# Patient Record
Sex: FEMALE | Race: WHITE | NOT HISPANIC OR LATINO
[De-identification: names, ages, dates, MRNs, and addresses within clinical notes are randomized per-mention and may not be internally consistent; named-entity substitution may affect disease eponyms.]

---

## 2017-01-24 ENCOUNTER — OTHER (OUTPATIENT)
Age: 77
End: 2017-01-24

## 2017-01-25 ENCOUNTER — OTHER (OUTPATIENT)
Age: 77
End: 2017-01-25

## 2017-01-25 ENCOUNTER — APPOINTMENT (OUTPATIENT)
Dept: RADIATION ONCOLOGY | Facility: CLINIC | Age: 77
End: 2017-01-25

## 2017-02-17 ENCOUNTER — OUTPATIENT (OUTPATIENT)
Dept: OUTPATIENT SERVICES | Facility: HOSPITAL | Age: 77
LOS: 1 days | Discharge: HOME | End: 2017-02-17

## 2017-02-17 DIAGNOSIS — Z98.89 OTHER SPECIFIED POSTPROCEDURAL STATES: Chronic | ICD-10-CM

## 2017-02-17 DIAGNOSIS — Z90.49 ACQUIRED ABSENCE OF OTHER SPECIFIED PARTS OF DIGESTIVE TRACT: Chronic | ICD-10-CM

## 2017-02-17 DIAGNOSIS — C22.1 INTRAHEPATIC BILE DUCT CARCINOMA: ICD-10-CM

## 2017-02-20 ENCOUNTER — RESULT REVIEW (OUTPATIENT)
Age: 77
End: 2017-02-20

## 2017-02-21 ENCOUNTER — RX RENEWAL (OUTPATIENT)
Age: 77
End: 2017-02-21

## 2017-02-21 ENCOUNTER — APPOINTMENT (OUTPATIENT)
Dept: HEMATOLOGY ONCOLOGY | Facility: CLINIC | Age: 77
End: 2017-02-21

## 2017-02-21 VITALS
RESPIRATION RATE: 16 BRPM | WEIGHT: 153.44 LBS | SYSTOLIC BLOOD PRESSURE: 159 MMHG | OXYGEN SATURATION: 98 % | TEMPERATURE: 98.1 F | DIASTOLIC BLOOD PRESSURE: 83 MMHG | BODY MASS INDEX: 29.97 KG/M2 | HEART RATE: 70 BPM

## 2017-02-21 DIAGNOSIS — K21.9 GASTRO-ESOPHAGEAL REFLUX DISEASE W/OUT ESOPHAGITIS: ICD-10-CM

## 2017-02-21 LAB
BASOPHILS # BLD AUTO: 0.1 K/UL — SIGNIFICANT CHANGE UP (ref 0–0.2)
BASOPHILS NFR BLD AUTO: 0.6 % — SIGNIFICANT CHANGE UP (ref 0–2)
EOSINOPHIL # BLD AUTO: 0.2 K/UL — SIGNIFICANT CHANGE UP (ref 0–0.5)
EOSINOPHIL NFR BLD AUTO: 2.3 % — SIGNIFICANT CHANGE UP (ref 0–6)
HCT VFR BLD CALC: 36.9 % — SIGNIFICANT CHANGE UP (ref 34.5–45)
HGB BLD-MCNC: 12.3 G/DL — SIGNIFICANT CHANGE UP (ref 11.5–15.5)
LYMPHOCYTES # BLD AUTO: 1.3 K/UL — SIGNIFICANT CHANGE UP (ref 1–3.3)
LYMPHOCYTES # BLD AUTO: 12 % — LOW (ref 13–44)
MCHC RBC-ENTMCNC: 30.6 PG — SIGNIFICANT CHANGE UP (ref 27–34)
MCHC RBC-ENTMCNC: 33.4 G/DL — SIGNIFICANT CHANGE UP (ref 32–36)
MCV RBC AUTO: 91.7 FL — SIGNIFICANT CHANGE UP (ref 80–100)
MONOCYTES # BLD AUTO: 0.8 K/UL — SIGNIFICANT CHANGE UP (ref 0–0.9)
MONOCYTES NFR BLD AUTO: 7.8 % — SIGNIFICANT CHANGE UP (ref 2–14)
NEUTROPHILS # BLD AUTO: 8.2 K/UL — HIGH (ref 1.8–7.4)
NEUTROPHILS NFR BLD AUTO: 77.2 % — HIGH (ref 43–77)
PLATELET # BLD AUTO: 232 K/UL — SIGNIFICANT CHANGE UP (ref 150–400)
RBC # BLD: 4.03 M/UL — SIGNIFICANT CHANGE UP (ref 3.8–5.2)
RBC # FLD: 12.4 % — SIGNIFICANT CHANGE UP (ref 10.3–14.5)
WBC # BLD: 10.5 K/UL — SIGNIFICANT CHANGE UP (ref 3.8–10.5)
WBC # FLD AUTO: 10.5 K/UL — SIGNIFICANT CHANGE UP (ref 3.8–10.5)

## 2017-02-27 LAB
ALBUMIN SERPL ELPH-MCNC: 4.1 G/DL
ALP BLD-CCNC: 101 U/L
ALT SERPL-CCNC: 26 U/L
ANION GAP SERPL CALC-SCNC: 14 MMOL/L
AST SERPL-CCNC: 23 U/L
BILIRUB SERPL-MCNC: 0.3 MG/DL
BUN SERPL-MCNC: 23 MG/DL
CALCIUM SERPL-MCNC: 9.8 MG/DL
CANCER AG19-9 SERPL-ACNC: 32.5 U/ML
CEA SERPL-MCNC: 3.2 NG/ML
CHLORIDE SERPL-SCNC: 102 MMOL/L
CO2 SERPL-SCNC: 24 MMOL/L
CREAT SERPL-MCNC: 0.89 MG/DL
FERRITIN SERPL-MCNC: 71.9 NG/ML
GLUCOSE SERPL-MCNC: 115 MG/DL
POTASSIUM SERPL-SCNC: 5 MMOL/L
PROT SERPL-MCNC: 6.6 G/DL
SODIUM SERPL-SCNC: 141 MMOL/L

## 2017-03-05 ENCOUNTER — FORM ENCOUNTER (OUTPATIENT)
Age: 77
End: 2017-03-05

## 2017-03-06 ENCOUNTER — OUTPATIENT (OUTPATIENT)
Dept: OUTPATIENT SERVICES | Facility: HOSPITAL | Age: 77
LOS: 1 days | End: 2017-03-06
Payer: MEDICARE

## 2017-03-06 ENCOUNTER — APPOINTMENT (OUTPATIENT)
Dept: CT IMAGING | Facility: CLINIC | Age: 77
End: 2017-03-06

## 2017-03-06 DIAGNOSIS — Z90.49 ACQUIRED ABSENCE OF OTHER SPECIFIED PARTS OF DIGESTIVE TRACT: Chronic | ICD-10-CM

## 2017-03-06 DIAGNOSIS — Z00.8 ENCOUNTER FOR OTHER GENERAL EXAMINATION: ICD-10-CM

## 2017-03-06 DIAGNOSIS — Z98.89 OTHER SPECIFIED POSTPROCEDURAL STATES: Chronic | ICD-10-CM

## 2017-03-06 PROCEDURE — 74177 CT ABD & PELVIS W/CONTRAST: CPT

## 2017-03-06 PROCEDURE — 71260 CT THORAX DX C+: CPT

## 2017-03-09 ENCOUNTER — APPOINTMENT (OUTPATIENT)
Dept: FAMILY MEDICINE | Facility: CLINIC | Age: 77
End: 2017-03-09

## 2017-03-09 VITALS
SYSTOLIC BLOOD PRESSURE: 124 MMHG | DIASTOLIC BLOOD PRESSURE: 70 MMHG | HEART RATE: 72 BPM | WEIGHT: 153.38 LBS | HEIGHT: 60 IN | BODY MASS INDEX: 30.11 KG/M2

## 2017-03-09 VITALS — DIASTOLIC BLOOD PRESSURE: 90 MMHG | SYSTOLIC BLOOD PRESSURE: 140 MMHG | HEART RATE: 72 BPM | RESPIRATION RATE: 16 BRPM

## 2017-03-09 RX ORDER — UREA 10 %
140 (45 FE) LOTION (ML) TOPICAL
Refills: 0 | Status: DISCONTINUED | COMMUNITY
Start: 2017-02-21 | End: 2017-03-09

## 2017-03-23 ENCOUNTER — APPOINTMENT (OUTPATIENT)
Dept: RADIATION ONCOLOGY | Facility: CLINIC | Age: 77
End: 2017-03-23

## 2017-03-23 VITALS
HEART RATE: 74 BPM | BODY MASS INDEX: 30.05 KG/M2 | WEIGHT: 153.88 LBS | OXYGEN SATURATION: 98 % | SYSTOLIC BLOOD PRESSURE: 164 MMHG | DIASTOLIC BLOOD PRESSURE: 90 MMHG | RESPIRATION RATE: 18 BRPM

## 2017-04-09 ENCOUNTER — RX RENEWAL (OUTPATIENT)
Age: 77
End: 2017-04-09

## 2017-05-10 ENCOUNTER — APPOINTMENT (OUTPATIENT)
Dept: FAMILY MEDICINE | Facility: CLINIC | Age: 77
End: 2017-05-10

## 2017-05-10 ENCOUNTER — RX RENEWAL (OUTPATIENT)
Age: 77
End: 2017-05-10

## 2017-05-10 VITALS
BODY MASS INDEX: 29.3 KG/M2 | DIASTOLIC BLOOD PRESSURE: 86 MMHG | HEIGHT: 60 IN | SYSTOLIC BLOOD PRESSURE: 138 MMHG | WEIGHT: 149.25 LBS

## 2017-05-10 VITALS — SYSTOLIC BLOOD PRESSURE: 140 MMHG | HEART RATE: 80 BPM | DIASTOLIC BLOOD PRESSURE: 90 MMHG | RESPIRATION RATE: 16 BRPM

## 2017-05-11 LAB
ALBUMIN SERPL ELPH-MCNC: 4.1 G/DL
ALP BLD-CCNC: 94 U/L
ALT SERPL-CCNC: 27 U/L
ANION GAP SERPL CALC-SCNC: 14 MMOL/L
AST SERPL-CCNC: 31 U/L
BASOPHILS # BLD AUTO: 0.02 K/UL
BASOPHILS NFR BLD AUTO: 0.2 %
BILIRUB SERPL-MCNC: 0.3 MG/DL
BUN SERPL-MCNC: 18 MG/DL
CALCIUM SERPL-MCNC: 10 MG/DL
CHLORIDE SERPL-SCNC: 102 MMOL/L
CHOLEST SERPL-MCNC: 170 MG/DL
CHOLEST/HDLC SERPL: 4 RATIO
CO2 SERPL-SCNC: 23 MMOL/L
CREAT SERPL-MCNC: 0.78 MG/DL
EOSINOPHIL # BLD AUTO: 0.22 K/UL
EOSINOPHIL NFR BLD AUTO: 2.6 %
GLUCOSE SERPL-MCNC: 119 MG/DL
HBA1C MFR BLD HPLC: 5.9 %
HCT VFR BLD CALC: 36.9 %
HDLC SERPL-MCNC: 43 MG/DL
HGB BLD-MCNC: 11.7 G/DL
IMM GRANULOCYTES NFR BLD AUTO: 0.2 %
LDLC SERPL CALC-MCNC: 97 MG/DL
LYMPHOCYTES # BLD AUTO: 1.2 K/UL
LYMPHOCYTES NFR BLD AUTO: 14.2 %
MAN DIFF?: NORMAL
MCHC RBC-ENTMCNC: 29 PG
MCHC RBC-ENTMCNC: 31.7 GM/DL
MCV RBC AUTO: 91.6 FL
MONOCYTES # BLD AUTO: 0.48 K/UL
MONOCYTES NFR BLD AUTO: 5.7 %
NEUTROPHILS # BLD AUTO: 6.52 K/UL
NEUTROPHILS NFR BLD AUTO: 77.1 %
PLATELET # BLD AUTO: 262 K/UL
POTASSIUM SERPL-SCNC: 4 MMOL/L
PROT SERPL-MCNC: 7.1 G/DL
RBC # BLD: 4.03 M/UL
RBC # FLD: 14.6 %
SODIUM SERPL-SCNC: 139 MMOL/L
T4 SERPL-MCNC: 6.2 UG/DL
TRIGL SERPL-MCNC: 150 MG/DL
TSH SERPL-ACNC: 3.22 UIU/ML
URATE SERPL-MCNC: 3.3 MG/DL
WBC # FLD AUTO: 8.46 K/UL

## 2017-05-12 ENCOUNTER — TRANSCRIPTION ENCOUNTER (OUTPATIENT)
Age: 77
End: 2017-05-12

## 2017-06-01 ENCOUNTER — OUTPATIENT (OUTPATIENT)
Dept: OUTPATIENT SERVICES | Facility: HOSPITAL | Age: 77
LOS: 1 days | Discharge: HOME | End: 2017-06-01

## 2017-06-01 DIAGNOSIS — C22.1 INTRAHEPATIC BILE DUCT CARCINOMA: ICD-10-CM

## 2017-06-01 DIAGNOSIS — Z98.89 OTHER SPECIFIED POSTPROCEDURAL STATES: Chronic | ICD-10-CM

## 2017-06-01 DIAGNOSIS — Z90.49 ACQUIRED ABSENCE OF OTHER SPECIFIED PARTS OF DIGESTIVE TRACT: Chronic | ICD-10-CM

## 2017-06-06 ENCOUNTER — RESULT REVIEW (OUTPATIENT)
Age: 77
End: 2017-06-06

## 2017-06-06 ENCOUNTER — INPATIENT (INPATIENT)
Facility: HOSPITAL | Age: 77
LOS: 6 days | Discharge: HOME | DRG: 380 | End: 2017-06-13
Attending: INTERNAL MEDICINE | Admitting: STUDENT IN AN ORGANIZED HEALTH CARE EDUCATION/TRAINING PROGRAM
Payer: MEDICARE

## 2017-06-06 ENCOUNTER — APPOINTMENT (OUTPATIENT)
Dept: HEMATOLOGY ONCOLOGY | Facility: CLINIC | Age: 77
End: 2017-06-06

## 2017-06-06 VITALS
RESPIRATION RATE: 18 BRPM | TEMPERATURE: 98 F | OXYGEN SATURATION: 100 % | HEART RATE: 88 BPM | DIASTOLIC BLOOD PRESSURE: 91 MMHG | SYSTOLIC BLOOD PRESSURE: 153 MMHG

## 2017-06-06 VITALS
TEMPERATURE: 97.8 F | WEIGHT: 142.86 LBS | DIASTOLIC BLOOD PRESSURE: 72 MMHG | HEART RATE: 82 BPM | RESPIRATION RATE: 16 BRPM | OXYGEN SATURATION: 100 % | BODY MASS INDEX: 27.9 KG/M2 | SYSTOLIC BLOOD PRESSURE: 108 MMHG

## 2017-06-06 DIAGNOSIS — Z98.89 OTHER SPECIFIED POSTPROCEDURAL STATES: Chronic | ICD-10-CM

## 2017-06-06 DIAGNOSIS — D62 ACUTE POSTHEMORRHAGIC ANEMIA: ICD-10-CM

## 2017-06-06 DIAGNOSIS — I95.1 ORTHOSTATIC HYPOTENSION: ICD-10-CM

## 2017-06-06 DIAGNOSIS — C22.1 INTRAHEPATIC BILE DUCT CARCINOMA: ICD-10-CM

## 2017-06-06 DIAGNOSIS — Z29.9 ENCOUNTER FOR PROPHYLACTIC MEASURES, UNSPECIFIED: ICD-10-CM

## 2017-06-06 DIAGNOSIS — E11.9 TYPE 2 DIABETES MELLITUS WITHOUT COMPLICATIONS: ICD-10-CM

## 2017-06-06 DIAGNOSIS — D64.9 ANEMIA, UNSPECIFIED: ICD-10-CM

## 2017-06-06 DIAGNOSIS — I10 ESSENTIAL (PRIMARY) HYPERTENSION: ICD-10-CM

## 2017-06-06 DIAGNOSIS — Z90.49 ACQUIRED ABSENCE OF OTHER SPECIFIED PARTS OF DIGESTIVE TRACT: Chronic | ICD-10-CM

## 2017-06-06 LAB
ALBUMIN SERPL ELPH-MCNC: 4.1 G/DL — SIGNIFICANT CHANGE UP (ref 3.3–5)
ALP SERPL-CCNC: 90 U/L — SIGNIFICANT CHANGE UP (ref 40–120)
ALT FLD-CCNC: 26 U/L RC — SIGNIFICANT CHANGE UP (ref 10–45)
ANION GAP SERPL CALC-SCNC: 15 MMOL/L — SIGNIFICANT CHANGE UP (ref 5–17)
ANISOCYTOSIS BLD QL: SLIGHT — SIGNIFICANT CHANGE UP
APTT BLD: 26.7 SEC — LOW (ref 27.5–37.4)
AST SERPL-CCNC: 23 U/L — SIGNIFICANT CHANGE UP (ref 10–40)
BASE EXCESS BLDV CALC-SCNC: -0.1 MMOL/L — SIGNIFICANT CHANGE UP (ref -2–2)
BASOPHILS # BLD AUTO: 0 K/UL — SIGNIFICANT CHANGE UP (ref 0–0.2)
BASOPHILS # BLD AUTO: 0 K/UL — SIGNIFICANT CHANGE UP (ref 0–0.2)
BASOPHILS NFR BLD AUTO: 0.4 % — SIGNIFICANT CHANGE UP (ref 0–2)
BILIRUB SERPL-MCNC: 0.4 MG/DL — SIGNIFICANT CHANGE UP (ref 0.2–1.2)
BLD GP AB SCN SERPL QL: NEGATIVE — SIGNIFICANT CHANGE UP
BUN SERPL-MCNC: 15 MG/DL — SIGNIFICANT CHANGE UP (ref 7–23)
CA-I SERPL-SCNC: 1.24 MMOL/L — SIGNIFICANT CHANGE UP (ref 1.12–1.3)
CALCIUM SERPL-MCNC: 9.6 MG/DL — SIGNIFICANT CHANGE UP (ref 8.4–10.5)
CHLORIDE BLDV-SCNC: 107 MMOL/L — SIGNIFICANT CHANGE UP (ref 96–108)
CHLORIDE SERPL-SCNC: 104 MMOL/L — SIGNIFICANT CHANGE UP (ref 96–108)
CO2 BLDV-SCNC: 25 MMOL/L — SIGNIFICANT CHANGE UP (ref 22–30)
CO2 SERPL-SCNC: 22 MMOL/L — SIGNIFICANT CHANGE UP (ref 22–31)
CREAT SERPL-MCNC: 0.77 MG/DL — SIGNIFICANT CHANGE UP (ref 0.5–1.3)
EOSINOPHIL # BLD AUTO: 0.2 K/UL — SIGNIFICANT CHANGE UP (ref 0–0.5)
EOSINOPHIL # BLD AUTO: 0.2 K/UL — SIGNIFICANT CHANGE UP (ref 0–0.5)
EOSINOPHIL NFR BLD AUTO: 1 % — SIGNIFICANT CHANGE UP (ref 0–6)
EOSINOPHIL NFR BLD AUTO: 2.2 % — SIGNIFICANT CHANGE UP (ref 0–6)
GAS PNL BLDV: 136 MMOL/L — SIGNIFICANT CHANGE UP (ref 136–145)
GAS PNL BLDV: SIGNIFICANT CHANGE UP
GAS PNL BLDV: SIGNIFICANT CHANGE UP
GLUCOSE BLDV-MCNC: 117 MG/DL — HIGH (ref 70–99)
GLUCOSE SERPL-MCNC: 124 MG/DL — HIGH (ref 70–99)
HCO3 BLDV-SCNC: 24 MMOL/L — SIGNIFICANT CHANGE UP (ref 21–29)
HCT VFR BLD CALC: 17.7 % — CRITICAL LOW (ref 34.5–45)
HCT VFR BLD CALC: 19 % — CRITICAL LOW (ref 34.5–45)
HCT VFR BLDA CALC: 19 % — CRITICAL LOW (ref 39–50)
HGB BLD CALC-MCNC: 6.1 G/DL — CRITICAL LOW (ref 11.5–15.5)
HGB BLD-MCNC: 6 G/DL — CRITICAL LOW (ref 11.5–15.5)
HGB BLD-MCNC: 6.1 G/DL — CRITICAL LOW (ref 11.5–15.5)
HYPOCHROMIA BLD QL: SLIGHT — SIGNIFICANT CHANGE UP
INR BLD: 0.95 RATIO — SIGNIFICANT CHANGE UP (ref 0.88–1.16)
LACTATE BLDV-MCNC: 1.4 MMOL/L — SIGNIFICANT CHANGE UP (ref 0.7–2)
LDH SERPL L TO P-CCNC: 154 U/L — SIGNIFICANT CHANGE UP (ref 50–242)
LYMPHOCYTES # BLD AUTO: 1.6 K/UL — SIGNIFICANT CHANGE UP (ref 1–3.3)
LYMPHOCYTES # BLD AUTO: 1.8 K/UL — SIGNIFICANT CHANGE UP (ref 1–3.3)
LYMPHOCYTES # BLD AUTO: 18.6 % — SIGNIFICANT CHANGE UP (ref 13–44)
LYMPHOCYTES # BLD AUTO: 25 % — SIGNIFICANT CHANGE UP (ref 13–44)
MACROCYTES BLD QL: SLIGHT — SIGNIFICANT CHANGE UP
MCHC RBC-ENTMCNC: 29.8 PG — SIGNIFICANT CHANGE UP (ref 27–34)
MCHC RBC-ENTMCNC: 30.3 PG — SIGNIFICANT CHANGE UP (ref 27–34)
MCHC RBC-ENTMCNC: 32.3 GM/DL — SIGNIFICANT CHANGE UP (ref 32–36)
MCHC RBC-ENTMCNC: 33.7 G/DL — SIGNIFICANT CHANGE UP (ref 32–36)
MCV RBC AUTO: 90 FL — SIGNIFICANT CHANGE UP (ref 80–100)
MCV RBC AUTO: 92.3 FL — SIGNIFICANT CHANGE UP (ref 80–100)
MICROCYTES BLD QL: SLIGHT — SIGNIFICANT CHANGE UP
MONOCYTES # BLD AUTO: 0.7 K/UL — SIGNIFICANT CHANGE UP (ref 0–0.9)
MONOCYTES # BLD AUTO: 0.8 K/UL — SIGNIFICANT CHANGE UP (ref 0–0.9)
MONOCYTES NFR BLD AUTO: 2 % — SIGNIFICANT CHANGE UP (ref 2–14)
MONOCYTES NFR BLD AUTO: 8.8 % — SIGNIFICANT CHANGE UP (ref 2–14)
NEUTROPHILS # BLD AUTO: 5.9 K/UL — SIGNIFICANT CHANGE UP (ref 1.8–7.4)
NEUTROPHILS # BLD AUTO: 6 K/UL — SIGNIFICANT CHANGE UP (ref 1.8–7.4)
NEUTROPHILS NFR BLD AUTO: 69.9 % — SIGNIFICANT CHANGE UP (ref 43–77)
NEUTROPHILS NFR BLD AUTO: 72 % — SIGNIFICANT CHANGE UP (ref 43–77)
NRBC # BLD: 2 /100 — HIGH (ref 0–0)
PCO2 BLDV: 40 MMHG — SIGNIFICANT CHANGE UP (ref 35–50)
PH BLDV: 7.4 — SIGNIFICANT CHANGE UP (ref 7.35–7.45)
PLAT MORPH BLD: NORMAL — SIGNIFICANT CHANGE UP
PLATELET # BLD AUTO: 402 K/UL — HIGH (ref 150–400)
PLATELET # BLD AUTO: 417 K/UL — HIGH (ref 150–400)
PO2 BLDV: 19 MMHG — LOW (ref 25–45)
POIKILOCYTOSIS BLD QL AUTO: SLIGHT — SIGNIFICANT CHANGE UP
POLYCHROMASIA BLD QL SMEAR: SLIGHT — SIGNIFICANT CHANGE UP
POTASSIUM BLDV-SCNC: 3.9 MMOL/L — SIGNIFICANT CHANGE UP (ref 3.5–5)
POTASSIUM SERPL-MCNC: 4.4 MMOL/L — SIGNIFICANT CHANGE UP (ref 3.5–5.3)
POTASSIUM SERPL-SCNC: 4.4 MMOL/L — SIGNIFICANT CHANGE UP (ref 3.5–5.3)
PROT SERPL-MCNC: 6.8 G/DL — SIGNIFICANT CHANGE UP (ref 6–8.3)
PROTHROM AB SERPL-ACNC: 10.3 SEC — SIGNIFICANT CHANGE UP (ref 9.8–12.7)
RBC # BLD: 1.96 M/UL — LOW (ref 3.8–5.2)
RBC # BLD: 2.06 M/UL — LOW (ref 3.8–5.2)
RBC # FLD: 15.3 % — HIGH (ref 10.3–14.5)
RBC # FLD: 15.5 % — HIGH (ref 10.3–14.5)
RBC BLD AUTO: ABNORMAL
RH IG SCN BLD-IMP: POSITIVE — SIGNIFICANT CHANGE UP
SAO2 % BLDV: 21 % — LOW (ref 67–88)
SODIUM SERPL-SCNC: 141 MMOL/L — SIGNIFICANT CHANGE UP (ref 135–145)
WBC # BLD: 8.6 K/UL — SIGNIFICANT CHANGE UP (ref 3.8–10.5)
WBC # BLD: 8.6 K/UL — SIGNIFICANT CHANGE UP (ref 3.8–10.5)
WBC # FLD AUTO: 8.6 K/UL — SIGNIFICANT CHANGE UP (ref 3.8–10.5)
WBC # FLD AUTO: 8.6 K/UL — SIGNIFICANT CHANGE UP (ref 3.8–10.5)

## 2017-06-06 PROCEDURE — 71010: CPT | Mod: 26

## 2017-06-06 PROCEDURE — 99223 1ST HOSP IP/OBS HIGH 75: CPT | Mod: AI,GC

## 2017-06-06 PROCEDURE — 99285 EMERGENCY DEPT VISIT HI MDM: CPT | Mod: 25,GC

## 2017-06-06 PROCEDURE — 93010 ELECTROCARDIOGRAM REPORT: CPT | Mod: GC

## 2017-06-06 RX ORDER — VALSARTAN 80 MG/1
160 TABLET ORAL DAILY
Qty: 0 | Refills: 0 | Status: DISCONTINUED | OUTPATIENT
Start: 2017-06-06 | End: 2017-06-06

## 2017-06-06 RX ORDER — METOPROLOL TARTRATE 50 MG
50 TABLET ORAL DAILY
Qty: 0 | Refills: 0 | Status: DISCONTINUED | OUTPATIENT
Start: 2017-06-06 | End: 2017-06-06

## 2017-06-06 RX ORDER — ESOMEPRAZOLE MAGNESIUM 40 MG/1
20 CAPSULE, DELAYED RELEASE ORAL
Qty: 0 | Refills: 0 | COMMUNITY

## 2017-06-06 RX ORDER — PANTOPRAZOLE SODIUM 20 MG/1
80 TABLET, DELAYED RELEASE ORAL ONCE
Qty: 0 | Refills: 0 | Status: COMPLETED | OUTPATIENT
Start: 2017-06-06 | End: 2017-06-06

## 2017-06-06 RX ORDER — SODIUM CHLORIDE 9 MG/ML
1000 INJECTION INTRAMUSCULAR; INTRAVENOUS; SUBCUTANEOUS
Qty: 0 | Refills: 0 | Status: COMPLETED | OUTPATIENT
Start: 2017-06-06 | End: 2017-06-07

## 2017-06-06 RX ORDER — PANTOPRAZOLE SODIUM 20 MG/1
40 TABLET, DELAYED RELEASE ORAL
Qty: 0 | Refills: 0 | Status: DISCONTINUED | OUTPATIENT
Start: 2017-06-06 | End: 2017-06-13

## 2017-06-06 RX ORDER — ALPRAZOLAM 0.25 MG
1 TABLET ORAL
Qty: 0 | Refills: 0 | COMMUNITY

## 2017-06-06 RX ORDER — ATORVASTATIN CALCIUM 80 MG/1
40 TABLET, FILM COATED ORAL AT BEDTIME
Qty: 0 | Refills: 0 | Status: DISCONTINUED | OUTPATIENT
Start: 2017-06-06 | End: 2017-06-13

## 2017-06-06 RX ORDER — ALPRAZOLAM 0.25 MG
0.25 TABLET ORAL DAILY
Qty: 0 | Refills: 0 | Status: DISCONTINUED | OUTPATIENT
Start: 2017-06-06 | End: 2017-06-07

## 2017-06-06 RX ADMIN — Medication 0.25 MILLIGRAM(S): at 22:18

## 2017-06-06 RX ADMIN — PANTOPRAZOLE SODIUM 80 MILLIGRAM(S): 20 TABLET, DELAYED RELEASE ORAL at 20:36

## 2017-06-06 RX ADMIN — SODIUM CHLORIDE 75 MILLILITER(S): 9 INJECTION INTRAMUSCULAR; INTRAVENOUS; SUBCUTANEOUS at 22:19

## 2017-06-06 NOTE — H&P ADULT. - OTHER CARE PROVIDERS
Oncologist: Dr. Adama Stahl ([facility]); Rad Onc: Dr. Edward, Dr. Valdez; GI: Dr. Mancuso

## 2017-06-06 NOTE — H&P ADULT. - ATTENDING COMMENTS
76F with h/o cholangiocarcinoma s/p partial resection s/p chemo/RT, HTN, HLD, DMII, and anxiety  p/w symptomatic anemia , Hgb of 6.0 ( baseline 12) reporting  melanotic stool , c/o lightheadedness , on ASA at home ,  BP stable, s/p 2U PRBC in ED; will monitor hgb closely, will keep NPO and call GI for EGD

## 2017-06-06 NOTE — H&P ADULT. - PROBLEM SELECTOR PLAN 1
Hgb of 6.1  on admission (from baseline Hgb of ~12). Guaic positive in ED, and history of melanotic stool concerning for GIB. Most likely 2/2 upper GI source (differential dx: peptic ulcer disease vs erosive gastritis (2/2 radiation effect?) vs AVM/ ectasia vs 2/2 malignancy). S/p 2U PRBC in ED.   - keep NPO for now; GI consult in a.m.; pt likely benefit from EGD +/- colonoscopy   - c/w protonix 40 IV BID likely in setting of UGIB   - f/u post-transfusion CBC   - c/t trend Hgb [id number]; transfuse for Hgb<7.0   - hold home ASA and avoid A/C  - NS IVF @ 75cc/hr while NPO

## 2017-06-06 NOTE — H&P ADULT. - HISTORY OF PRESENT ILLNESS
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  Pt had routine follow-up appt with her oncologist earlier today, during which time labs incidentally revealed a Hgb of 6.1/ Hct of 17.7 (MCV 90), prompitng referral to ER for further evaluation and blood transfusion. Pt denies any overt signs of GIB; no hematochezia, no BPPBR, no hematemesis, no melanotic sools.    In the ED, VS: T: 98.3, HR: 80-90s, BP: 153-123/91-66, RR: 18-17, SpO2: % on RA. Labs on admission notable for Hgb of 6.1 (from 6.0 this a.m.), Hct of 19, MCV 92.3, plt 417, serum glucose 124. In the ED, pt recieved 1U PRBC, 2U currently being transfused and protonix 80mg IV x 1. CXR reveals clear lungs b/l. 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  Pt had routine follow-up appt with her oncologist earlier today, during which time labs incidentally revealed a Hgb of 6.1/ Hct of 17.7 (MCV 90), prompitng referral to ER for further evaluation and blood transfusion. Pt denies any overt signs of GIB; no hematochezia, no BPPBR, no hematemesis, no melanotic stools.  Pt has never had colonoscopy performed. Had surveillance EUS/ERCP last performed 11/2016, that revealed normal esophagus/stomach/ duondenal bulb with aquired severe duodenal stenosis.     In the ED, VS: T: 98.3, HR: 80-90s, BP: 153-123/91-66, RR: 18-17, SpO2: % on RA. Labs on admission notable for Hgb of 6.1 (from 6.0 this a.m.), Hct of 19, MCV 92.3, plt 417, serum glucose 124. In the ED, pt recieved 1U PRBC, 2U currently being transfused and protonix 80mg IV x 1. CXR reveals clear lungs b/l. 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  Pt had routine follow-up appt with her oncologist earlier today, during which time labs incidentally revealed a Hgb of 6.1/ Hct of 17.7 (MCV 90), prompitng referral to ER for further evaluation and blood transfusion.  Pt reports extreme fatigue since Friday 6/2 and "heaviness" of her legs. She also endorses noticing dark, tarry stool over the past few days, which she has never experienced in the past. No hematochezia, no BPPBR, no hematemesis. Several weeks prior, pt reports nausea and NBNB emesis which last for a few days, but had since resolved. During that time she was also endorsing GERD, dyspepsia, bloating, increased belching and postprandial nausea, which her oncoloigst attributed to a side effect of the radiation. She has been taking esomeprazole and ranitidine PRN at home with improvement in symptoms. She has regular BMs (last one today); no noticeable change in stool caliber or bowel habits.  Pt has never had colonoscopy performed. Had surveillance EUS/ERCP last performed 11/2016, that revealed normal esophagus/stomach/ duondenal bulb with aquired severe duodenal stenosis.  Pt on ASA 81mg PO qd at home. Denies any EtOH use and denies frequent NSAID use.     In the ED, VS: T: 98.3, HR: 80-90s, BP: 153-123/91-66, RR: 18-17, SpO2: % on RA. Labs on admission notable for Hgb of 6.1 (from 6.0 this a.m.), Hct of 19, MCV 92.3, plt 417, serum glucose 124. In the ED, pt recieved 1U PRBC, 2U currently being transfused and protonix 80mg IV x 1. CXR reveals clear lungs b/l.

## 2017-06-06 NOTE — H&P ADULT. - NEGATIVE MUSCULOSKELETAL SYMPTOMS
no myalgia/no arthritis/no arthralgia/no muscle cramps/no muscle weakness/no stiffness/no joint swelling

## 2017-06-06 NOTE — H&P ADULT. - NEGATIVE NEUROLOGICAL SYMPTOMS
no paresthesias/no syncope/no headache/no transient paralysis/no loss of sensation/no weakness/no vertigo/no loss of consciousness/no generalized seizures/no difficulty walking/no focal seizures/no confusion

## 2017-06-06 NOTE — H&P ADULT. - NEGATIVE OPHTHALMOLOGIC SYMPTOMS
no lacrimation R/no loss of vision L/no blurred vision L/no photophobia/no lacrimation L/no loss of vision R/no diplopia/no blurred vision R

## 2017-06-06 NOTE — H&P ADULT. - RS GEN PE MLT RESP DETAILS PC
no chest wall tenderness/no wheezes/good air movement/breath sounds equal/respirations non-labored/normal/clear to auscultation bilaterally/no intercostal retractions/no rhonchi/airway patent/no rales

## 2017-06-06 NOTE — H&P ADULT. - NEGATIVE GENERAL GENITOURINARY SYMPTOMS
no incontinence/no hematuria/no urine discoloration/no renal colic/no flank pain L/normal urinary frequency/no urinary hesitancy/no dysuria/no flank pain R

## 2017-06-06 NOTE — H&P ADULT. - NEGATIVE ENMT SYMPTOMS
no sinus symptoms/no nasal congestion/no nasal discharge/no nose bleeds/no dry mouth/no dysphagia/no throat pain/no nasal obstruction/no vertigo/no tinnitus/no hearing difficulty/no gum bleeding

## 2017-06-06 NOTE — H&P ADULT. - MUSCULOSKELETAL
details… detailed exam normal strength/no joint swelling/normal/ROM intact/no calf tenderness/no joint warmth/no joint erythema

## 2017-06-06 NOTE — ED PROVIDER NOTE - ATTENDING CONTRIBUTION TO CARE
76 yof pmhx gb cancer s/p resection and biliary stent, prev on chemo (last 1 yr ago) and radiation (last 6 mo ago) Dr. Adama Smith (onc), dm, presents with mild gen weakness. pt states went to Dr. Smith today for regularly scheduled check up and was found to be anemic, sent in for blood trans. states noted her stool ahs been somewhat dark recently.     no abd pain. mild nausea/vomiting weeks ago - resolved.   no abd tenderness, + heme stool.     will transfuse, admit for gib and severe anemia, ? radiation enteritis. RUBY Reed MD 76 yof pmhx gb cancer s/p resection and biliary stent, prev on chemo (last 1 yr ago) and radiation (last 6 mo ago) Dr. Adama Smith (onc), dm, presents with mild gen weakness. pt states went to Dr. Smith today for regularly scheduled check up and was found to be anemic, sent in for blood trans. states noted her stool ahs been somewhat dark recently. no f/c, no abd pain, no diarrhea. states had some mild n/v a few weeks ago which resolved.     ROS:   constitutional - no fever, no chills, + gen weakness  eyes - no visual changes, no redness  eent - no sore throat, no nasal congestion  cvs - no chest pain, no leg swelling  resp - no shortness of breath, no cough  gi - no abdominal pain, no vomiting, no diarrhea, + dark stool   gu - no dysuria, no hematuria  msk - no acute back pain, no joint swelling  skin - no rashes, no jaundice  neuro - no headache, no focal weakness  psych - no acute mental health issue      Physical Exam:   constitutional - well appearing, awake and alert, oriented x3  head - no external evidence of trauma  cvs - rrr, no murmurs, no peripheral edema  resp - breath sounds clear and equal bilat  gi - abdomen soft and nontender, no rigidity, guarding or rebound, bowel sounds present, + heme pos stool.   msk - moving all extremities spontaneously  neuro - alert and oriented x3, no focal deficits, CNs 2-12 grossly intact  skin- no jaundice, warm and dry  psych - mood and affect wnl, no apparent risk to self or others     will transfuse, admit for gib and severe anemia. RUBY Reed MD

## 2017-06-06 NOTE — H&P ADULT. - NEGATIVE GASTROINTESTINAL SYMPTOMS
no vomiting/no abdominal pain/no jaundice/no change in bowel habits/no melena/no steatorrhea/no hematochezia/no nausea/no diarrhea/no constipation

## 2017-06-06 NOTE — H&P ADULT. - GASTROINTESTINAL DETAILS
no bruit/normal/no masses palpable/soft/no distention/no organomegaly/bowel sounds normal/no guarding/no rigidity/nontender/no rebound tenderness

## 2017-06-06 NOTE — ED ADULT NURSE NOTE - OBJECTIVE STATEMENT
Pt's Oncologist (Dr Stahl) referred pt to the ED due to abnormal lab values and generalized weakness. Pt had episodes of severe vomiting 2 weeks ago but not now. Pt also c/o of blood in stool

## 2017-06-06 NOTE — ED PROVIDER NOTE - OBJECTIVE STATEMENT
76 year old female with h/o gallbladder CA s/p surgery, chemo, and radiation, went to [facility] for routine check up today and was told she had a low blood count (Hb 6.1, was 12.3 approx 4 months ago), sent in for transfusion. Complaining of weakness progressive for weeks. Denies grossly bloody stools but guaiac positive on exam. Never had a colonoscopy. Denies chest pain, dyspnea.   Onc Adama Stahl

## 2017-06-06 NOTE — H&P ADULT. - ASSESSMENT
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 Pt remains HD stable, s/p 2U PRBC transfused.

## 2017-06-06 NOTE — ED PROVIDER NOTE - MEDICAL DECISION MAKING DETAILS
76 year old female sent in for anemia from [facility], pale, guaiac positive on exam, normal vitals, will send iron studies per hematology recs and admit to hospital

## 2017-06-06 NOTE — ED ADULT NURSE NOTE - PMH
Anxiety    Arrhythmia  s/p ERCP, irregular heart beat  Biliary obstruction    Current use of aspirin    Diabetes mellitus    Difficult extubation  s/p ERCP, hypotension  Hyperlipidemia    Hypertension    Malignant neoplasm of biliary tract

## 2017-06-06 NOTE — H&P ADULT. - NEUROLOGICAL DETAILS
responds to pain/sensation intact/responds to verbal commands/cranial nerves intact/alert and oriented x 3/normal strength

## 2017-06-07 ENCOUNTER — RESULT REVIEW (OUTPATIENT)
Age: 77
End: 2017-06-07

## 2017-06-07 DIAGNOSIS — D64.9 ANEMIA, UNSPECIFIED: ICD-10-CM

## 2017-06-07 DIAGNOSIS — K92.2 GASTROINTESTINAL HEMORRHAGE, UNSPECIFIED: ICD-10-CM

## 2017-06-07 DIAGNOSIS — K92.1 MELENA: ICD-10-CM

## 2017-06-07 LAB
ALBUMIN SERPL ELPH-MCNC: 3.4 G/DL — SIGNIFICANT CHANGE UP (ref 3.3–5)
ALBUMIN SERPL ELPH-MCNC: 3.8 G/DL
ALP BLD-CCNC: 89 U/L
ALP SERPL-CCNC: 91 U/L — SIGNIFICANT CHANGE UP (ref 40–120)
ALT FLD-CCNC: 19 U/L — SIGNIFICANT CHANGE UP (ref 10–45)
ALT SERPL-CCNC: 20 U/L
ANION GAP SERPL CALC-SCNC: 13 MMOL/L — SIGNIFICANT CHANGE UP (ref 5–17)
ANION GAP SERPL CALC-SCNC: 7 MMOL/L
AST SERPL-CCNC: 18 U/L
AST SERPL-CCNC: 18 U/L — SIGNIFICANT CHANGE UP (ref 10–40)
BASOPHILS # BLD AUTO: 0 K/UL — SIGNIFICANT CHANGE UP (ref 0–0.2)
BASOPHILS NFR BLD AUTO: 0 % — SIGNIFICANT CHANGE UP (ref 0–2)
BILIRUB SERPL-MCNC: 0.3 MG/DL
BILIRUB SERPL-MCNC: 0.8 MG/DL — SIGNIFICANT CHANGE UP (ref 0.2–1.2)
BUN SERPL-MCNC: 13 MG/DL — SIGNIFICANT CHANGE UP (ref 7–23)
BUN SERPL-MCNC: 14 MG/DL
CALCIUM SERPL-MCNC: 9.1 MG/DL
CALCIUM SERPL-MCNC: 9.3 MG/DL — SIGNIFICANT CHANGE UP (ref 8.4–10.5)
CANCER AG19-9 SERPL-ACNC: 27.3 U/ML
CEA SERPL-MCNC: 8.4 NG/ML
CHLORIDE SERPL-SCNC: 102 MMOL/L
CHLORIDE SERPL-SCNC: 108 MMOL/L — SIGNIFICANT CHANGE UP (ref 96–108)
CO2 SERPL-SCNC: 19 MMOL/L — LOW (ref 22–31)
CO2 SERPL-SCNC: 27 MMOL/L
CREAT SERPL-MCNC: 0.71 MG/DL — SIGNIFICANT CHANGE UP (ref 0.5–1.3)
CREAT SERPL-MCNC: 0.77 MG/DL
EOSINOPHIL # BLD AUTO: 0.1 K/UL — SIGNIFICANT CHANGE UP (ref 0–0.5)
EOSINOPHIL NFR BLD AUTO: 3 % — SIGNIFICANT CHANGE UP (ref 0–6)
FERRITIN SERPL-MCNC: 27 NG/ML — SIGNIFICANT CHANGE UP (ref 15–150)
GLUCOSE SERPL-MCNC: 124 MG/DL — HIGH (ref 70–99)
GLUCOSE SERPL-MCNC: 129 MG/DL
HAPTOGLOB SERPL-MCNC: 334 MG/DL — HIGH (ref 34–200)
HCT VFR BLD CALC: 27.7 % — LOW (ref 34.5–45)
HCT VFR BLD CALC: 28.4 % — LOW (ref 34.5–45)
HGB BLD-MCNC: 8.9 G/DL — LOW (ref 11.5–15.5)
HGB BLD-MCNC: 9.2 G/DL — LOW (ref 11.5–15.5)
IRON SATN MFR SERPL: 16 UG/DL — LOW (ref 30–160)
IRON SATN MFR SERPL: 5 % — LOW (ref 14–50)
LDH SERPL L TO P-CCNC: 262 U/L — HIGH (ref 50–242)
LPL SERPL-CCNC: 26 U/L
LYMPHOCYTES # BLD AUTO: 6 % — LOW (ref 13–44)
LYMPHOCYTES # BLD AUTO: SIGNIFICANT CHANGE UP (ref 1–3.3)
MCHC RBC-ENTMCNC: 29.1 PG — SIGNIFICANT CHANGE UP (ref 27–34)
MCHC RBC-ENTMCNC: 29.6 PG — SIGNIFICANT CHANGE UP (ref 27–34)
MCHC RBC-ENTMCNC: 32.1 GM/DL — SIGNIFICANT CHANGE UP (ref 32–36)
MCHC RBC-ENTMCNC: 32.3 GM/DL — SIGNIFICANT CHANGE UP (ref 32–36)
MCV RBC AUTO: 90.5 FL — SIGNIFICANT CHANGE UP (ref 80–100)
MCV RBC AUTO: 91.7 FL — SIGNIFICANT CHANGE UP (ref 80–100)
MONOCYTES # BLD AUTO: 0.8 K/UL — SIGNIFICANT CHANGE UP (ref 0–0.9)
MONOCYTES NFR BLD AUTO: 10 % — SIGNIFICANT CHANGE UP (ref 2–14)
NEUTROPHILS # BLD AUTO: 6.1 K/UL — SIGNIFICANT CHANGE UP (ref 1.8–7.4)
NEUTROPHILS NFR BLD AUTO: 80 % — HIGH (ref 43–77)
PLATELET # BLD AUTO: 347 K/UL — SIGNIFICANT CHANGE UP (ref 150–400)
PLATELET # BLD AUTO: 359 K/UL — SIGNIFICANT CHANGE UP (ref 150–400)
POTASSIUM SERPL-MCNC: 3.9 MMOL/L — SIGNIFICANT CHANGE UP (ref 3.5–5.3)
POTASSIUM SERPL-SCNC: 3.9 MMOL/L — SIGNIFICANT CHANGE UP (ref 3.5–5.3)
POTASSIUM SERPL-SCNC: 4.5 MMOL/L
PROT SERPL-MCNC: 6.1 G/DL — SIGNIFICANT CHANGE UP (ref 6–8.3)
PROT SERPL-MCNC: 6.2 G/DL
RBC # BLD: 3.06 M/UL — LOW (ref 3.8–5.2)
RBC # BLD: 3.1 M/UL — LOW (ref 3.8–5.2)
RBC # FLD: 13.8 % — SIGNIFICANT CHANGE UP (ref 10.3–14.5)
RBC # FLD: 14.1 % — SIGNIFICANT CHANGE UP (ref 10.3–14.5)
SODIUM SERPL-SCNC: 136 MMOL/L
SODIUM SERPL-SCNC: 140 MMOL/L — SIGNIFICANT CHANGE UP (ref 135–145)
TIBC SERPL-MCNC: 352 UG/DL — SIGNIFICANT CHANGE UP (ref 220–430)
UIBC SERPL-MCNC: 336 UG/DL — SIGNIFICANT CHANGE UP (ref 110–370)
WBC # BLD: 8.3 K/UL — SIGNIFICANT CHANGE UP (ref 3.8–10.5)
WBC # BLD: 8.9 K/UL — SIGNIFICANT CHANGE UP (ref 3.8–10.5)
WBC # FLD AUTO: 8.3 K/UL — SIGNIFICANT CHANGE UP (ref 3.8–10.5)
WBC # FLD AUTO: 8.9 K/UL — SIGNIFICANT CHANGE UP (ref 3.8–10.5)

## 2017-06-07 PROCEDURE — 88305 TISSUE EXAM BY PATHOLOGIST: CPT | Mod: 26

## 2017-06-07 PROCEDURE — 99223 1ST HOSP IP/OBS HIGH 75: CPT

## 2017-06-07 PROCEDURE — 99233 SBSQ HOSP IP/OBS HIGH 50: CPT

## 2017-06-07 RX ORDER — ALPRAZOLAM 0.25 MG
0.5 TABLET ORAL AT BEDTIME
Qty: 0 | Refills: 0 | Status: DISCONTINUED | OUTPATIENT
Start: 2017-06-07 | End: 2017-06-13

## 2017-06-07 RX ORDER — ACETAMINOPHEN 500 MG
650 TABLET ORAL EVERY 6 HOURS
Qty: 0 | Refills: 0 | Status: DISCONTINUED | OUTPATIENT
Start: 2017-06-07 | End: 2017-06-13

## 2017-06-07 RX ADMIN — SODIUM CHLORIDE 75 MILLILITER(S): 9 INJECTION INTRAMUSCULAR; INTRAVENOUS; SUBCUTANEOUS at 18:04

## 2017-06-07 RX ADMIN — SODIUM CHLORIDE 75 MILLILITER(S): 9 INJECTION INTRAMUSCULAR; INTRAVENOUS; SUBCUTANEOUS at 08:56

## 2017-06-07 RX ADMIN — PANTOPRAZOLE SODIUM 40 MILLIGRAM(S): 20 TABLET, DELAYED RELEASE ORAL at 18:07

## 2017-06-07 RX ADMIN — ATORVASTATIN CALCIUM 40 MILLIGRAM(S): 80 TABLET, FILM COATED ORAL at 23:53

## 2017-06-07 RX ADMIN — Medication 650 MILLIGRAM(S): at 13:09

## 2017-06-07 RX ADMIN — PANTOPRAZOLE SODIUM 40 MILLIGRAM(S): 20 TABLET, DELAYED RELEASE ORAL at 05:38

## 2017-06-07 NOTE — CONSULT NOTE ADULT - ASSESSMENT
76 F w/ h/o cholangiocarcinoma s/p resection, chemo, RT now a/w GIB 2/2 duodenal ulcer s/p biopsy 76 F w/ h/o cholangiocarcinoma s/p resection, chemo, RT now a/w GIB 2/2 duodenal ulcer s/p biopsy, found to have GOO and duodenal stricture

## 2017-06-07 NOTE — PROGRESS NOTE ADULT - SUBJECTIVE AND OBJECTIVE BOX
Referring Physician:    Procedure: EGD    Moderate sedation [ ]      Sedation by Anesthesia [x ]    Indication for Procedure: Gastrointestinal bleed    Pertinent History:    PAST MEDICAL & SURGICAL HISTORY:  Current use of aspirin  Difficult extubation: s/p ERCP, hypotension  Malignant neoplasm of biliary tract  Arrhythmia: s/p ERCP, irregular heart beat  Biliary obstruction  Anxiety  Hyperlipidemia  Hypertension  Diabetes mellitus  History of cholecystectomy  S/P ERCP: 12/2015  History of biliary stent insertion: 12/2015  No significant past surgical history      PMH of Gastroparesis [ ]  Gastric Surgery [ ]  Gastric Outlet Obstruction [ ] no    Allergies    No Known Allergies    Intolerances: None    Latex allergy [ ] yes [x ] no    Medications:MEDICATIONS  (STANDING):  sodium chloride 0.9%. 1000milliLiter(s) IV Continuous <Continuous>  pantoprazole  Injectable 40milliGRAM(s) IV Push two times a day  atorvastatin 40milliGRAM(s) Oral at bedtime    MEDICATIONS  (PRN):  ALPRAZolam 0.25milliGRAM(s) Oral daily PRN anxiety  acetaminophen   Tablet. 650milliGRAM(s) Oral every 6 hours PRN Moderate Pain (4 - 6)      Smoking: [ ] yes  [x] no    AICD/PPM: [ ] yes   [x ] no    Pertinent lab data:                        8.9    8.3   )-----------( 359      ( 07 Jun 2017 06:13 )             27.7     06-07    140  |  108  |  13  ----------------------------<  124<H>  3.9   |  19<L>  |  0.71    Ca    9.3      07 Jun 2017 07:20    TPro  6.1  /  Alb  3.4  /  TBili  0.8  /  DBili  x   /  AST  18  /  ALT  19  /  AlkPhos  91  06-07    PT/INR - ( 06 Jun 2017 18:59 )   PT: 10.3 sec;   INR: 0.95 ratio         PTT - ( 06 Jun 2017 18:59 )  PTT:26.7 sec          RADIOLOGY RESULTS:    Physical Examination:  Daily Height in cm: 152.4 (06 Jun 2017 16:40)    Daily   Vital Signs Last 24 Hrs  T(C): 36.7, Max: 37 (06-07 @ 00:58)  T(F): 98, Max: 98.6 (06-07 @ 00:58)  HR: 71 (71 - 92)  BP: 132/76 (123/77 - 168/81)  BP(mean): --  RR: 18 (17 - 18)  SpO2: 96% (96% - 100%)  BP:                 HR:                  SPO2:               Temperature:    Constitutional: NAD  HEENT: PERRLA, EOMI,     Neck:  No JVD  Respiratory: CTAB/L  Cardiovascular: S1 and S2  Gastrointestinal: BS+, soft, NT/ND  Extremities: No peripheral edema  Neurological: A/O x 3, no focal deficits  Psychiatric: Normal mood, normal affect  : No Smith  Skin: No rashes    Comments:    ASA Class: I [ ]  II [ ]  III [ ]  IV [ ]    The patient is a suitable candidate for the planned procedure unless box checked [ ]  No, explain: Referring Physician: Yuli Myers MD    Procedure: EGD    Moderate sedation [ ]      Sedation by Anesthesia [x ]    Indication for Procedure: Gastrointestinal bleed    Pertinent History: 77 yo female     PAST MEDICAL & SURGICAL HISTORY:  Current use of aspirin  Difficult extubation: s/p ERCP, hypotension  Malignant neoplasm of biliary tract  Arrhythmia: s/p ERCP, irregular heart beat  Biliary obstruction  Anxiety  Hyperlipidemia  Hypertension  Diabetes mellitus  History of cholecystectomy  S/P ERCP: 12/2015  History of biliary stent insertion: 12/2015  No significant past surgical history      PMH of Gastroparesis [ ]  Gastric Surgery [ ]  Gastric Outlet Obstruction [ ] no    Allergies    No Known Allergies    Intolerances: None    Latex allergy [ ] yes [x ] no    Medications:MEDICATIONS  (STANDING):  sodium chloride 0.9%. 1000milliLiter(s) IV Continuous <Continuous>  pantoprazole  Injectable 40milliGRAM(s) IV Push two times a day  atorvastatin 40milliGRAM(s) Oral at bedtime    MEDICATIONS  (PRN):  ALPRAZolam 0.25milliGRAM(s) Oral daily PRN anxiety  acetaminophen   Tablet. 650milliGRAM(s) Oral every 6 hours PRN Moderate Pain (4 - 6)      Smoking: [ ] yes  [x] no    AICD/PPM: [ ] yes   [x ] no    Pertinent lab data:                        8.9    8.3   )-----------( 359      ( 07 Jun 2017 06:13 )             27.7     06-07    140  |  108  |  13  ----------------------------<  124<H>  3.9   |  19<L>  |  0.71    Ca    9.3      07 Jun 2017 07:20    TPro  6.1  /  Alb  3.4  /  TBili  0.8  /  DBili  x   /  AST  18  /  ALT  19  /  AlkPhos  91  06-07    PT/INR - ( 06 Jun 2017 18:59 )   PT: 10.3 sec;   INR: 0.95 ratio         PTT - ( 06 Jun 2017 18:59 )  PTT:26.7 sec          RADIOLOGY RESULTS:    Physical Examination:  Daily Height in cm: 152.4 (06 Jun 2017 16:40)    Daily   Vital Signs Last 24 Hrs  T(C): 36.7, Max: 37 (06-07 @ 00:58)  T(F): 98, Max: 98.6 (06-07 @ 00:58)  HR: 71 (71 - 92)  BP: 132/76 (123/77 - 168/81)  BP(mean): --  RR: 18 (17 - 18)  SpO2: 96% (96% - 100%)    BP:                 HR:                  SPO2:               Temperature:    Constitutional: NAD    HEENT: PERRLA, EOMI,       Neck:  No JVD    Respiratory: CTAB/L    Cardiovascular: S1 and S2    Gastrointestinal: BS+, soft, NT/ND    Extremities: No peripheral edema    Neurological: A/O x 3, no focal deficits    Psychiatric: Normal mood, normal affect    : No Smith    Skin: No rashes    Comments:    ASA Class: I [ ]  II [ ]  III [X ]  IV [ ]    The patient is a suitable candidate for the planned procedure unless box checked [ ]  No, explain: Referring Physician: Yuli Myers MD    Procedure: EGD    Moderate sedation [ ]      Sedation by Anesthesia [x ]    Indication for Procedure: Gastrointestinal bleed    Pertinent History: 77 yo female with PMH below p/w melenic stools, acute blood loss anemia requiring PRBCS    PAST MEDICAL & SURGICAL HISTORY:  Current use of aspirin  Difficult extubation: s/p ERCP, hypotension  Malignant neoplasm of biliary tract  Arrhythmia: s/p ERCP, irregular heart beat  Biliary obstruction  Anxiety  Hyperlipidemia  Hypertension  Diabetes mellitus  History of cholecystectomy  S/P ERCP: 12/2015  History of biliary stent insertion: 12/2015  No significant past surgical history      PMH of Gastroparesis [ ]  Gastric Surgery [ ]  Gastric Outlet Obstruction [ ] no    Allergies    No Known Allergies    Intolerances: None    Latex allergy [ ] yes [x ] no    Medications:MEDICATIONS  (STANDING):  sodium chloride 0.9%. 1000milliLiter(s) IV Continuous <Continuous>  pantoprazole  Injectable 40milliGRAM(s) IV Push two times a day  atorvastatin 40milliGRAM(s) Oral at bedtime    MEDICATIONS  (PRN):  ALPRAZolam 0.25milliGRAM(s) Oral daily PRN anxiety  acetaminophen   Tablet. 650milliGRAM(s) Oral every 6 hours PRN Moderate Pain (4 - 6)      Smoking: [ ] yes  [x] no    AICD/PPM: [ ] yes   [x ] no    Pertinent lab data:                        8.9    8.3   )-----------( 359      ( 07 Jun 2017 06:13 )             27.7     06-07    140  |  108  |  13  ----------------------------<  124<H>  3.9   |  19<L>  |  0.71    Ca    9.3      07 Jun 2017 07:20    TPro  6.1  /  Alb  3.4  /  TBili  0.8  /  DBili  x   /  AST  18  /  ALT  19  /  AlkPhos  91  06-07    PT/INR - ( 06 Jun 2017 18:59 )   PT: 10.3 sec;   INR: 0.95 ratio         PTT - ( 06 Jun 2017 18:59 )  PTT:26.7 sec          RADIOLOGY RESULTS:    Physical Examination:  Daily Height in cm: 152.4 (06 Jun 2017 16:40)    Daily   Vital Signs Last 24 Hrs  T(C): 36.7, Max: 37 (06-07 @ 00:58)  T(F): 98, Max: 98.6 (06-07 @ 00:58)  HR: 71 (71 - 92)  BP: 132/76 (123/77 - 168/81)  BP(mean): --  RR: 18 (17 - 18)  SpO2: 96% (96% - 100%)    BP:                 HR:                  SPO2:               Temperature:    Constitutional: NAD    HEENT: PERRLA, EOMI,       Neck:  No JVD    Respiratory: CTAB/L    Cardiovascular: S1 and S2    Gastrointestinal: BS+, soft, NT/ND    Extremities: No peripheral edema    Neurological: A/O x 3, no focal deficits    Psychiatric: Normal mood, normal affect    : No Smith    Skin: No rashes    Comments:    ASA Class: I [ ]  II [ ]  III [X ]  IV [ ]    The patient is a suitable candidate for the planned procedure unless box checked [ ]  No, explain:

## 2017-06-07 NOTE — CONSULT NOTE ADULT - PROBLEM SELECTOR RECOMMENDATION 9
- Monitor H/H and transfuse as needed
Biopsy is negative for malignancy. No active bleeding noted.   H/H stable today.   f/u GI re w/u of celiac disease

## 2017-06-07 NOTE — ED ADULT NURSE REASSESSMENT NOTE - NS ED NURSE REASSESS COMMENT FT1
Second unit of PRBCs completed. Patient tolerated well. Report given to holding RN . Pt aware. Pt currently comfortable. VSS.

## 2017-06-07 NOTE — CONSULT NOTE ADULT - SUBJECTIVE AND OBJECTIVE BOX
Patient is a 76y old  Female who presents with a chief complaint of anemia (06 Jun 2017 21:16)      HPI:  76F with h/o cholangiocarcinoma (T3N1M0, moderate to poorly differentiated adenocarcinoma) s/p partial resection and chemo/RT (s/p 5 gemfibrizole + CIS (2 weeks on , then 1 week off)-- last dose 7/14/2016 , completed SBRT in 12/2016) with metallic stent placed for biliary stricture (2/2016), HTN, HLD, DMII (HgbA1c 5.9%) who presented to her oncologist, Dr. Smith with shortness of breath on exertion and increased fatigue for several days. Pt had routine blood work-up which demonstrated significant anemia (Hgb 6) and she was instructed to come to ED.   The patient states that for several days she has been passing black tarry stool. She also noticed mild left upper quadrant pain after eating. Pain is 3/10 intensity, sharp, lasting for several hours. She otherwise denies chest pain, dizziness, syncope, shortness of breath at rest, nausea, vomiting, diarrhea, constipation, red blood in stool.   The patient currently take Advil PM every night.   In the ED, the patient was hemodynamically stable. Hgb was 6.1 (baseline 12). Pt transfused a total of 2 units of PRBC (currently Hgb 8.9)      PAST MEDICAL HISTORY:  Cholangiocarcinoma s/p Chemo/XRT and biliary stent placement  Anxiety  Hyperlipidemia  Hypertension  Diabetes mellitus    SURGICAL HISTORY:  Cholecystectomy and partial liver resection  S/P ERCP with metallic biliary stent placement: 12/2015      Allergies: No Known Allergies      MEDICATIONS  (STANDING):  sodium chloride 0.9%. 1000milliLiter(s) IV Continuous <Continuous>  pantoprazole  Injectable 40milliGRAM(s) IV Push two times a day  atorvastatin 40milliGRAM(s) Oral at bedtime    MEDICATIONS  (PRN):  ALPRAZolam 0.25milliGRAM(s) Oral daily PRN anxiety  acetaminophen   Tablet. 650milliGRAM(s) Oral every 6 hours PRN Moderate Pain (4 - 6)      Social History:  Denies smoking or illicit drug use    Review of Systems:  General:  Fatigue and weakness. No wt loss, fevers, chills, night sweats  CV:  No pain, palpitations  Resp:  Dyspnea on exertion. Denies cough, tachypnea, wheezing  GI:  Melena and left upper quadrant pain. No nausea, No vomiting, No diarrhea, No constipation, No weight loss, No fever, No pruritis, No rectal bleeding, No dysphagia,  :  No pain, bleeding, incontinence, nocturia  Muscle:  No pain, weakness  Neuro:  No weakness, tingling, memory problems  Psych:  No fatigue, insomnia, mood problems, depression  Endocrine:  No polyuria, polydypsia, cold/heat intolerance  Heme:  No petechiae, ecchymosis, easy bruisability  Skin:  No rash, tattoos, scars, edema      Vital Signs Last 24 Hrs  T(C): 36.7, Max: 37 (06-07 @ 00:58)  T(F): 98, Max: 98.6 (06-07 @ 00:58)  HR: 71 (71 - 92)  BP: 132/76 (123/77 - 168/81)  BP(mean): --  RR: 18 (17 - 18)  SpO2: 96% (96% - 100%)    PHYSICAL EXAM:    Constitutional: Pale appearing but comfortable and NAD, well-developed  Neck: No LAD, supple  Respiratory: CTA and P  Cardiovascular: S1 and S2, RRR, no M  Gastrointestinal: BS+, soft, NT/ND, neg HSM,  Extremities: No peripheral edema, neg clubing, cyanosis  Vascular: 2+ peripheral pulses  Neurological: A/O x 3, no focal deficits  Psychiatric: Normal mood, normal affect  Skin: No rashes        LABS:                        8.9    8.3   )-----------( 359      ( 07 Jun 2017 06:13 )             27.7     06-07    140  |  108  |  13  ----------------------------<  124<H>  3.9   |  19<L>  |  0.71    Ca    9.3      07 Jun 2017 07:20    TPro  6.1  /  Alb  3.4  /  TBili  0.8  /  DBili  x   /  AST  18  /  ALT  19  /  AlkPhos  91  06-07    PT/INR - ( 06 Jun 2017 18:59 )   PT: 10.3 sec;   INR: 0.95 ratio         PTT - ( 06 Jun 2017 18:59 )  PTT:26.7 sec    LIVER FUNCTIONS - ( 07 Jun 2017 07:20 )  Alb: 3.4 g/dL / Pro: 6.1 g/dL / ALK PHOS: 91 U/L / ALT: 19 U/L / AST: 18 U/L / GGT: x             RADIOLOGY & ADDITIONAL TESTS:  CXR: The heart is normal in size.  The lungs are clear.    No pleural effusions or pneumothorax.

## 2017-06-07 NOTE — CONSULT NOTE ADULT - PROBLEM SELECTOR RECOMMENDATION 2
- Will plan for upper endoscopy today. Risk, benefits and alternatives were discussed.   - PPI IV BID  - If no source of upper GI bleeding found, will consider colonoscopy.  - Pt instructed to avoid NSAIDs
Would check CT CAP w/ contrast to eval for mets

## 2017-06-07 NOTE — CONSULT NOTE ADULT - SUBJECTIVE AND OBJECTIVE BOX
Patient is a 76y old  Female who presents with a chief complaint of     HPI:  76F with h/o cholangiocarcinoma (T3N1M0, moderate to poorly differentiated adenocarcinoma) s/p partial resection and chemo/RT (s/p 5 gemfibrizole + CIS (2 weeks on , then 1 week off)-- last dose 7/14/2016 , completed SBRT in 12/2016) with metallic stent placed for biliary stricture (2/2016), HTN, HLD, DMII (HgbA1c 5.9%), and anxiety referred to ER by her oncologist for symptomatic anemia (Hgb of 6.0, from baseline of ~12), with complaints of progressive weakness for the past few weeks.  Pt had routine follow-up appt with her oncologist earlier today, during which time labs incidentally revealed a Hgb of 6.1/ Hct of 17.7 (MCV 90), prompitng referral to ER for further evaluation and blood transfusion.  Pt reports extreme fatigue since Friday 6/2 and "heaviness" of her legs. She also endorses noticing dark, tarry stool over the past few days, which she has never experienced in the past. No hematochezia, no BPPBR, no hematemesis. Several weeks prior, pt reports nausea and NBNB emesis which last for a few days, but had since resolved. During that time she was also endorsing GERD, dyspepsia, bloating, increased belching and postprandial nausea, which her oncoloigst attributed to a side effect of the radiation. She has been taking esomeprazole and ranitidine PRN at home with improvement in symptoms. She has regular BMs (last one today); no noticeable change in stool caliber or bowel habits.  Pt has never had colonoscopy performed. Had surveillance EUS/ERCP last performed 11/2016, that revealed normal esophagus/stomach/ duondenal bulb with aquired severe duodenal stenosis.  Pt on ASA 81mg PO qd at home. Denies any EtOH use and denies frequent NSAID use.     In the ED, VS: T: 98.3, HR: 80-90s, BP: 153-123/91-66, RR: 18-17, SpO2: % on RA. Labs on admission notable for Hgb of 6.1 (from 6.0 this a.m.), Hct of 19, MCV 92.3, plt 417, serum glucose 124. In the ED, pt recieved 1U PRBC, 2U currently being transfused and protonix 80mg IV x 1. CXR reveals clear lungs b/l. (06 Jun 2017 21:16)       ROS:  Negative except for:    PAST MEDICAL & SURGICAL HISTORY:  Current use of aspirin  Difficult extubation: s/p ERCP, hypotension  Malignant neoplasm of biliary tract  Arrhythmia: s/p ERCP, irregular heart beat  Biliary obstruction  Anxiety  Hyperlipidemia  Hypertension  Diabetes mellitus  History of cholecystectomy  S/P ERCP: 12/2015  History of biliary stent insertion: 12/2015  No significant past surgical history      SOCIAL HISTORY: no tobacco, alcohol. lives with      FAMILY HISTORY:  No pertinent family history in first degree relatives      MEDICATIONS  (STANDING):  sodium chloride 0.9%. 1000milliLiter(s) IV Continuous <Continuous>  pantoprazole  Injectable 40milliGRAM(s) IV Push two times a day  atorvastatin 40milliGRAM(s) Oral at bedtime    MEDICATIONS  (PRN):  ALPRAZolam 0.25milliGRAM(s) Oral daily PRN anxiety  acetaminophen   Tablet. 650milliGRAM(s) Oral every 6 hours PRN Moderate Pain (4 - 6)      Allergies    No Known Allergies    Intolerances        Vital Signs Last 24 Hrs  T(C): 36.7, Max: 37 (06-07 @ 00:58)  T(F): 98, Max: 98.6 (06-07 @ 00:58)  HR: 71 (71 - 92)  BP: 132/76 (123/77 - 168/81)  BP(mean): --  RR: 18 (17 - 18)  SpO2: 96% (96% - 100%)    PHYSICAL EXAM  General: adult in NAD  HEENT: clear oropharynx, anicteric sclera, pink conjunctiva  Neck: supple  CV: normal S1/S2 with no murmur rubs or gallops  Lungs: positive air movement b/l ant lungs,clear to auscultation, no wheezes, no rales  Abdomen: soft non-tender non-distended, no hepatosplenomegaly  Ext: no clubbing cyanosis or edema  Skin: no rashes and no petechiae  Neuro: alert and oriented X 4, no focal deficits      LABS:                          8.9    8.3   )-----------( 359      ( 07 Jun 2017 06:13 )             27.7         Mean Cell Volume : 90.5 fl  Mean Cell Hemoglobin : 29.1 pg  Mean Cell Hemoglobin Concentration : 32.1 gm/dL  Auto Neutrophil # : 6.1 K/uL  Auto Lymphocyte # : See Note  Auto Monocyte # : 0.8 K/uL  Auto Eosinophil # : 0.1 K/uL  Auto Basophil # : 0.0 K/uL  Auto Neutrophil % : 80.0 %  Auto Lymphocyte % : 6.0 %  Auto Monocyte % : 10.0 %  Auto Eosinophil % : 3.0 %  Auto Basophil % : 0.0 %      06-07    140  |  108  |  13  ----------------------------<  124<H>  3.9   |  19<L>  |  0.71    Ca    9.3      07 Jun 2017 07:20    TPro  6.1  /  Alb  3.4  /  TBili  0.8  /  DBili  x   /  AST  18  /  ALT  19  /  AlkPhos  91  06-07      PT/INR - ( 06 Jun 2017 18:59 )   PT: 10.3 sec;   INR: 0.95 ratio         PTT - ( 06 Jun 2017 18:59 )  PTT:26.7 sec    Ferritin, Serum: 27.0 ng/mL (06-06 @ 22:53)  Iron - Total Binding Capacity.: 352 ug/dL (06-06 @ 22:53)                RADIOLOGY & ADDITIONAL STUDIES: Patient is a 76y old  Female who presents with a chief complaint of anemia, GIB    HPI:  76F with h/o cholangiocarcinoma with biliary obstruction s/p previous metal stent placement in Feb 2016 s/p partial resection followed by 5 cycles of Gemzar/Cisplatin, then SBRT completed in Dec 2016. Pt referred to ER by her oncologist for symptomatic anemia (Hgb of 6.0, from baseline of ~12), with complaints of progressive weakness for the past few weeks. Pt c/o fatigue. She also endorses noticing dark, tarry stool over the past few days, which she has never experienced in the past. No hematochezia, no BPPBR, no hematemesis. Several weeks prior, pt reports nausea and NBNB emesis which last for a few days, but had since resolved. During that time she was also endorsing GERD, dyspepsia, bloating, increased belching and postprandial nausea. She has been taking esomeprazole and ranitidine PRN at home with improvement in symptoms. She has regular BMs (last one today); no noticeable change in stool caliber or bowel habits.  Pt has never had colonoscopy performed.  Pt on ASA 81mg PO qd at home.     Received 2 units PRBCs on admission. Underwent EGD on 6/7 c/w duodenal ulcer suspicious for malignancy and gastric outlet obstruction.     ROS:  Negative except for:    PAST MEDICAL & SURGICAL HISTORY:  Current use of aspirin  Difficult extubation: s/p ERCP, hypotension  Malignant neoplasm of biliary tract  Arrhythmia: s/p ERCP, irregular heart beat  Biliary obstruction  Anxiety  Hyperlipidemia  Hypertension  Diabetes mellitus  History of cholecystectomy  S/P ERCP: 12/2015  History of biliary stent insertion: 12/2015  No significant past surgical history      SOCIAL HISTORY: no tobacco, alcohol. lives with      FAMILY HISTORY:  No pertinent family history in first degree relatives      MEDICATIONS  (STANDING):  sodium chloride 0.9%. 1000milliLiter(s) IV Continuous <Continuous>  pantoprazole  Injectable 40milliGRAM(s) IV Push two times a day  atorvastatin 40milliGRAM(s) Oral at bedtime    MEDICATIONS  (PRN):  ALPRAZolam 0.25milliGRAM(s) Oral daily PRN anxiety  acetaminophen   Tablet. 650milliGRAM(s) Oral every 6 hours PRN Moderate Pain (4 - 6)      Allergies    No Known Allergies    Intolerances        Vital Signs Last 24 Hrs  T(C): 36.7, Max: 37 (06-07 @ 00:58)  T(F): 98, Max: 98.6 (06-07 @ 00:58)  HR: 71 (71 - 92)  BP: 132/76 (123/77 - 168/81)  BP(mean): --  RR: 18 (17 - 18)  SpO2: 96% (96% - 100%)    PHYSICAL EXAM  General: adult in NAD  HEENT: clear oropharynx, anicteric sclera, pink conjunctiva  Neck: supple  CV: normal S1/S2 with no murmur rubs or gallops  Lungs: positive air movement b/l ant lungs,clear to auscultation, no wheezes, no rales  Abdomen: soft non-tender non-distended, no hepatosplenomegaly  Ext: no clubbing cyanosis or edema  Skin: no rashes and no petechiae  Neuro: alert and oriented X 4, no focal deficits      LABS:                          8.9    8.3   )-----------( 359      ( 07 Jun 2017 06:13 )             27.7         Mean Cell Volume : 90.5 fl  Mean Cell Hemoglobin : 29.1 pg  Mean Cell Hemoglobin Concentration : 32.1 gm/dL  Auto Neutrophil # : 6.1 K/uL  Auto Lymphocyte # : See Note  Auto Monocyte # : 0.8 K/uL  Auto Eosinophil # : 0.1 K/uL  Auto Basophil # : 0.0 K/uL  Auto Neutrophil % : 80.0 %  Auto Lymphocyte % : 6.0 %  Auto Monocyte % : 10.0 %  Auto Eosinophil % : 3.0 %  Auto Basophil % : 0.0 %      06-07    140  |  108  |  13  ----------------------------<  124<H>  3.9   |  19<L>  |  0.71    Ca    9.3      07 Jun 2017 07:20    TPro  6.1  /  Alb  3.4  /  TBili  0.8  /  DBili  x   /  AST  18  /  ALT  19  /  AlkPhos  91  06-07      PT/INR - ( 06 Jun 2017 18:59 )   PT: 10.3 sec;   INR: 0.95 ratio         PTT - ( 06 Jun 2017 18:59 )  PTT:26.7 sec    Ferritin, Serum: 27.0 ng/mL (06-06 @ 22:53)  Iron - Total Binding Capacity.: 352 ug/dL (06-06 @ 22:53)                RADIOLOGY & ADDITIONAL STUDIES: Patient is a 76y old  Female who presents with a chief complaint of anemia, GIB    HPI:  76F with h/o cholangiocarcinoma with biliary obstruction s/p previous metal stent placement in Feb 2016 s/p partial resection followed by 5 cycles of Gemzar/Cisplatin, then SBRT completed in Dec 2016. Pt referred to ER by her oncologist for symptomatic anemia (Hgb of 6.0, from baseline of ~12), with complaints of progressive weakness for the past few weeks. Pt c/o fatigue. She also endorses noticing dark, tarry stool over the past few days, which she has never experienced in the past. No hematochezia, no BPPBR, no hematemesis. Several weeks prior, pt reports nausea and NBNB emesis which last for a few days, but had since resolved. During that time she was also endorsing GERD, dyspepsia, bloating, increased belching and postprandial nausea. She has been taking esomeprazole and ranitidine PRN at home with improvement in symptoms. She has regular BMs (last one today); no noticeable change in stool caliber or bowel habits.  Pt has never had colonoscopy performed.  Pt on ASA 81mg PO qd at home.     Received 2 units PRBCs on admission. Underwent EGD on 6/7 c/w duodenal ulcer suspicious for malignancy and gastric outlet obstruction.     ROS: as above. All other 10 pt ROS negative  :    PAST MEDICAL & SURGICAL HISTORY:  Current use of aspirin  Difficult extubation: s/p ERCP, hypotension  Malignant neoplasm of biliary tract  Arrhythmia: s/p ERCP, irregular heart beat  Biliary obstruction  Anxiety  Hyperlipidemia  Hypertension  Diabetes mellitus  History of cholecystectomy  S/P ERCP: 12/2015  History of biliary stent insertion: 12/2015  No significant past surgical history      SOCIAL HISTORY: no tobacco, alcohol. lives with      FAMILY HISTORY:  No pertinent family history in first degree relatives      MEDICATIONS  (STANDING):  sodium chloride 0.9%. 1000milliLiter(s) IV Continuous <Continuous>  pantoprazole  Injectable 40milliGRAM(s) IV Push two times a day  atorvastatin 40milliGRAM(s) Oral at bedtime    MEDICATIONS  (PRN):  ALPRAZolam 0.25milliGRAM(s) Oral daily PRN anxiety  acetaminophen   Tablet. 650milliGRAM(s) Oral every 6 hours PRN Moderate Pain (4 - 6)      Allergies    No Known Allergies    Intolerances        Vital Signs Last 24 Hrs  T(C): 36.7, Max: 37 (06-07 @ 00:58)  T(F): 98, Max: 98.6 (06-07 @ 00:58)  HR: 71 (71 - 92)  BP: 132/76 (123/77 - 168/81)  BP(mean): --  RR: 18 (17 - 18)  SpO2: 96% (96% - 100%)    PHYSICAL EXAM  General: adult in NAD  HEENT: clear oropharynx, anicteric sclera, pink conjunctiva  Neck: supple  CV: normal S1/S2 with no murmur rubs or gallops  Lungs: positive air movement b/l ant lungs, clear to auscultation, no wheezes, no rales  Abdomen: soft, discomfort in epigastric area, non-distended, no hepatosplenomegaly  Ext: no clubbing cyanosis or edema  Skin: no rashes and no petechiae  Neuro: alert and oriented X 4, no focal deficits      LABS:                          8.9    8.3   )-----------( 359      ( 07 Jun 2017 06:13 )             27.7         Mean Cell Volume : 90.5 fl  Mean Cell Hemoglobin : 29.1 pg  Mean Cell Hemoglobin Concentration : 32.1 gm/dL  Auto Neutrophil # : 6.1 K/uL  Auto Lymphocyte # : See Note  Auto Monocyte # : 0.8 K/uL  Auto Eosinophil # : 0.1 K/uL  Auto Basophil # : 0.0 K/uL  Auto Neutrophil % : 80.0 %  Auto Lymphocyte % : 6.0 %  Auto Monocyte % : 10.0 %  Auto Eosinophil % : 3.0 %  Auto Basophil % : 0.0 %      06-07    140  |  108  |  13  ----------------------------<  124<H>  3.9   |  19<L>  |  0.71    Ca    9.3      07 Jun 2017 07:20    TPro  6.1  /  Alb  3.4  /  TBili  0.8  /  DBili  x   /  AST  18  /  ALT  19  /  AlkPhos  91  06-07      PT/INR - ( 06 Jun 2017 18:59 )   PT: 10.3 sec;   INR: 0.95 ratio         PTT - ( 06 Jun 2017 18:59 )  PTT:26.7 sec    Ferritin, Serum: 27.0 ng/mL (06-06 @ 22:53)  Iron - Total Binding Capacity.: 352 ug/dL (06-06 @ 22:53)                RADIOLOGY & ADDITIONAL STUDIES:

## 2017-06-07 NOTE — CONSULT NOTE ADULT - PROBLEM SELECTOR RECOMMENDATION 4
d/w GI. Will plan for stent placement next week.   stricture may be 2/2 previous RT to biliary area.

## 2017-06-07 NOTE — PROGRESS NOTE ADULT - SUBJECTIVE AND OBJECTIVE BOX
CC: GI bleed    HPI: 3 dark stools today. In bed. Denies dizziness    Vital Signs Last 24 Hrs  T(C): 36.7, Max: 37 (06-07 @ 00:58)  T(F): 98, Max: 98.6 (06-07 @ 00:58)  HR: 71 (71 - 92)  BP: 132/76 (123/77 - 168/81)  BP(mean): --  RR: 18 (17 - 18)  SpO2: 96% (96% - 100%)      Labs:     CBC Full  -  ( 07 Jun 2017 06:13 )  WBC Count : 8.3 K/uL  Hemoglobin : 8.9 g/dL  Hematocrit : 27.7 %  Platelet Count - Automated : 359 K/uL    LIVER FUNCTIONS - ( 07 Jun 2017 07:20 )  Alb: 3.4 g/dL / Pro: 6.1 g/dL / ALK PHOS: 91 U/L / ALT: 19 U/L / AST: 18 U/L / GGT: x             06-07    140  |  108  |  13  ----------------------------<  124<H>  3.9   |  19<L>  |  0.71    Ca    9.3      07 Jun 2017 07:20    TPro  6.1  /  Alb  3.4  /  TBili  0.8  /  DBili  x   /  AST  18  /  ALT  19  /  AlkPhos  91  06-07          MEDICATIONS  (STANDING):  sodium chloride 0.9%. 1000milliLiter(s) IV Continuous <Continuous>  pantoprazole  Injectable 40milliGRAM(s) IV Push two times a day  atorvastatin 40milliGRAM(s) Oral at bedtime

## 2017-06-08 DIAGNOSIS — K31.1 ADULT HYPERTROPHIC PYLORIC STENOSIS: ICD-10-CM

## 2017-06-08 DIAGNOSIS — D62 ACUTE POSTHEMORRHAGIC ANEMIA: ICD-10-CM

## 2017-06-08 DIAGNOSIS — K26.9 DUODENAL ULCER, UNSPECIFIED AS ACUTE OR CHRONIC, WITHOUT HEMORRHAGE OR PERFORATION: ICD-10-CM

## 2017-06-08 DIAGNOSIS — D50.8 OTHER IRON DEFICIENCY ANEMIAS: ICD-10-CM

## 2017-06-08 LAB
AMYLASE/CREAT SERPL: 53 U/L
ANION GAP SERPL CALC-SCNC: 16 MMOL/L — SIGNIFICANT CHANGE UP (ref 5–17)
BUN SERPL-MCNC: 9 MG/DL — SIGNIFICANT CHANGE UP (ref 7–23)
CALCIUM SERPL-MCNC: 8.8 MG/DL — SIGNIFICANT CHANGE UP (ref 8.4–10.5)
CHLORIDE SERPL-SCNC: 102 MMOL/L — SIGNIFICANT CHANGE UP (ref 96–108)
CO2 SERPL-SCNC: 21 MMOL/L — LOW (ref 22–31)
CREAT SERPL-MCNC: 0.77 MG/DL — SIGNIFICANT CHANGE UP (ref 0.5–1.3)
GLUCOSE SERPL-MCNC: 112 MG/DL — HIGH (ref 70–99)
HCT VFR BLD CALC: 28.6 % — LOW (ref 34.5–45)
HGB BLD-MCNC: 9.4 G/DL — LOW (ref 11.5–15.5)
MCHC RBC-ENTMCNC: 29.5 PG — SIGNIFICANT CHANGE UP (ref 27–34)
MCHC RBC-ENTMCNC: 32.9 GM/DL — SIGNIFICANT CHANGE UP (ref 32–36)
MCV RBC AUTO: 89.7 FL — SIGNIFICANT CHANGE UP (ref 80–100)
PLATELET # BLD AUTO: 352 K/UL — SIGNIFICANT CHANGE UP (ref 150–400)
POTASSIUM SERPL-MCNC: 3.6 MMOL/L — SIGNIFICANT CHANGE UP (ref 3.5–5.3)
POTASSIUM SERPL-SCNC: 3.6 MMOL/L — SIGNIFICANT CHANGE UP (ref 3.5–5.3)
RBC # BLD: 3.19 M/UL — LOW (ref 3.8–5.2)
RBC # FLD: 15.9 % — HIGH (ref 10.3–14.5)
SODIUM SERPL-SCNC: 139 MMOL/L — SIGNIFICANT CHANGE UP (ref 135–145)
SURGICAL PATHOLOGY STUDY: SIGNIFICANT CHANGE UP
WBC # BLD: 5.81 K/UL — SIGNIFICANT CHANGE UP (ref 3.8–10.5)
WBC # FLD AUTO: 5.81 K/UL — SIGNIFICANT CHANGE UP (ref 3.8–10.5)

## 2017-06-08 PROCEDURE — 99233 SBSQ HOSP IP/OBS HIGH 50: CPT

## 2017-06-08 PROCEDURE — 74177 CT ABD & PELVIS W/CONTRAST: CPT | Mod: 26

## 2017-06-08 PROCEDURE — 71260 CT THORAX DX C+: CPT | Mod: 26

## 2017-06-08 RX ORDER — ONDANSETRON 8 MG/1
4 TABLET, FILM COATED ORAL ONCE
Qty: 0 | Refills: 0 | Status: COMPLETED | OUTPATIENT
Start: 2017-06-08 | End: 2017-06-08

## 2017-06-08 RX ADMIN — Medication 650 MILLIGRAM(S): at 00:31

## 2017-06-08 RX ADMIN — Medication 650 MILLIGRAM(S): at 01:39

## 2017-06-08 RX ADMIN — PANTOPRAZOLE SODIUM 40 MILLIGRAM(S): 20 TABLET, DELAYED RELEASE ORAL at 05:29

## 2017-06-08 RX ADMIN — ATORVASTATIN CALCIUM 40 MILLIGRAM(S): 80 TABLET, FILM COATED ORAL at 21:09

## 2017-06-08 RX ADMIN — ONDANSETRON 4 MILLIGRAM(S): 8 TABLET, FILM COATED ORAL at 17:46

## 2017-06-08 RX ADMIN — PANTOPRAZOLE SODIUM 40 MILLIGRAM(S): 20 TABLET, DELAYED RELEASE ORAL at 17:14

## 2017-06-08 RX ADMIN — Medication 650 MILLIGRAM(S): at 08:43

## 2017-06-08 RX ADMIN — Medication 650 MILLIGRAM(S): at 09:43

## 2017-06-08 NOTE — PROGRESS NOTE ADULT - SUBJECTIVE AND OBJECTIVE BOX
CC: GI bleed    HPI: EGD done yesterday: A large amount of food (residue) and fluid in the stomach, suggestive of gastric outlet obstruction, diffuse, friable, clean base ulceration in the second portion of the duodenum, concerning for neoplasm, severe duodenal stricture distal to the area of the biliary stent.    Pt tolerating liquids without vomiting. Passing gas. 1 BM yesterday. Denies pain.       Vital Signs Last 24 Hrs  T(C): 36.9, Max: 36.9 (06-08 @ 07:34)  T(F): 98.4, Max: 98.4 (06-08 @ 07:34)  HR: 85 (68 - 87)  BP: 158/89 (146/83 - 158/89)  BP(mean): --  RR: 18 (18 - 20)  SpO2: 94% (94% - 99%)    Labs:  CBC Full  -  ( 08 Jun 2017 07:26 )  WBC Count : 5.81 K/uL  Hemoglobin : 9.4 g/dL  Hematocrit : 28.6 %  Platelet Count - Automated : 352 K/uL    139  |  102  |  9   ----------------------------<  112<H>  3.6   |  21<L>  |  0.77    Ca    8.8      08 Jun 2017 07:37    TPro  6.1  /  Alb  3.4  /  TBili  0.8  /  DBili  x   /  AST  18  /  ALT  19  /  AlkPhos  91  06-07      MEDICATIONS  (STANDING):  pantoprazole  Injectable 40milliGRAM(s) IV Push two times a day  atorvastatin 40milliGRAM(s) Oral at bedtime    MEDICATIONS  (PRN):  acetaminophen   Tablet. 650milliGRAM(s) Oral every 6 hours PRN Moderate Pain (4 - 6)  ALPRAZolam 0.5milliGRAM(s) Oral at bedtime PRN Anxiety or insomnia

## 2017-06-08 NOTE — PROGRESS NOTE ADULT - SUBJECTIVE AND OBJECTIVE BOX
INTERVAL HPI/OVERNIGHT EVENTS:    The patient feel nauseated today without vomiting. Only had a few sips of clears today. Denies abdominal pain. No BM today. Afebrile.     MEDICATIONS  (STANDING):  pantoprazole  Injectable 40milliGRAM(s) IV Push two times a day  atorvastatin 40milliGRAM(s) Oral at bedtime    MEDICATIONS  (PRN):  acetaminophen   Tablet. 650milliGRAM(s) Oral every 6 hours PRN Moderate Pain (4 - 6)  ALPRAZolam 0.5milliGRAM(s) Oral at bedtime PRN Anxiety or insomnia      Vital Signs Last 24 Hrs  T(C): 36.9, Max: 36.9 (06-08 @ 07:34)  T(F): 98.4, Max: 98.4 (06-08 @ 07:34)  HR: 85 (83 - 87)  BP: 158/89 (146/83 - 158/89)  BP(mean): --  RR: 18 (18 - 20)  SpO2: 94% (94% - 95%)    PHYSICAL EXAM:  Constitutional: AOx3 NAD  Respiratory: CTA and P  Cardiovascular: S1 and S2, RRR, no M  Gastrointestinal: soft, non-tender, non-distended, normal bowel sounds.   Extremities: No peripheral edema, neg clubing, cyanosis      LABS:                        9.4    5.81  )-----------( 352      ( 08 Jun 2017 07:26 )             28.6     06-08    139  |  102  |  9   ----------------------------<  112<H>  3.6   |  21<L>  |  0.77    Ca    8.8      08 Jun 2017 07:37    TPro  6.1  /  Alb  3.4  /  TBili  0.8  /  DBili  x   /  AST  18  /  ALT  19  /  AlkPhos  91  06-07    PT/INR - ( 06 Jun 2017 18:59 )   PT: 10.3 sec;   INR: 0.95 ratio         PTT - ( 06 Jun 2017 18:59 )  PTT:26.7 sec    LIVER FUNCTIONS - ( 07 Jun 2017 07:20 )  Alb: 3.4 g/dL / Pro: 6.1 g/dL / ALK PHOS: 91 U/L / ALT: 19 U/L / AST: 18 U/L / GGT: x             RADIOLOGY & ADDITIONAL TESTS:  Duodenal biopsies: Negative for malignancy.

## 2017-06-09 LAB
ALBUMIN SERPL ELPH-MCNC: 3.4 G/DL — SIGNIFICANT CHANGE UP (ref 3.3–5)
ALP SERPL-CCNC: 107 U/L — SIGNIFICANT CHANGE UP (ref 40–120)
ALT FLD-CCNC: 18 U/L — SIGNIFICANT CHANGE UP (ref 10–45)
ANION GAP SERPL CALC-SCNC: 17 MMOL/L — SIGNIFICANT CHANGE UP (ref 5–17)
AST SERPL-CCNC: 23 U/L — SIGNIFICANT CHANGE UP (ref 10–40)
BILIRUB DIRECT SERPL-MCNC: 0.3 MG/DL — HIGH (ref 0–0.2)
BILIRUB INDIRECT FLD-MCNC: 0.6 MG/DL — SIGNIFICANT CHANGE UP (ref 0.2–1)
BILIRUB SERPL-MCNC: 0.9 MG/DL — SIGNIFICANT CHANGE UP (ref 0.2–1.2)
BUN SERPL-MCNC: 12 MG/DL — SIGNIFICANT CHANGE UP (ref 7–23)
CALCIUM SERPL-MCNC: 9.2 MG/DL — SIGNIFICANT CHANGE UP (ref 8.4–10.5)
CHLORIDE SERPL-SCNC: 99 MMOL/L — SIGNIFICANT CHANGE UP (ref 96–108)
CO2 SERPL-SCNC: 23 MMOL/L — SIGNIFICANT CHANGE UP (ref 22–31)
CREAT SERPL-MCNC: 0.8 MG/DL — SIGNIFICANT CHANGE UP (ref 0.5–1.3)
GLUCOSE SERPL-MCNC: 102 MG/DL — HIGH (ref 70–99)
POTASSIUM SERPL-MCNC: 3.8 MMOL/L — SIGNIFICANT CHANGE UP (ref 3.5–5.3)
POTASSIUM SERPL-SCNC: 3.8 MMOL/L — SIGNIFICANT CHANGE UP (ref 3.5–5.3)
PROT SERPL-MCNC: 6.4 G/DL — SIGNIFICANT CHANGE UP (ref 6–8.3)
SODIUM SERPL-SCNC: 139 MMOL/L — SIGNIFICANT CHANGE UP (ref 135–145)

## 2017-06-09 PROCEDURE — 99233 SBSQ HOSP IP/OBS HIGH 50: CPT

## 2017-06-09 PROCEDURE — 99232 SBSQ HOSP IP/OBS MODERATE 35: CPT

## 2017-06-09 RX ADMIN — ATORVASTATIN CALCIUM 40 MILLIGRAM(S): 80 TABLET, FILM COATED ORAL at 21:14

## 2017-06-09 RX ADMIN — PANTOPRAZOLE SODIUM 40 MILLIGRAM(S): 20 TABLET, DELAYED RELEASE ORAL at 05:17

## 2017-06-09 RX ADMIN — Medication 650 MILLIGRAM(S): at 18:28

## 2017-06-09 RX ADMIN — PANTOPRAZOLE SODIUM 40 MILLIGRAM(S): 20 TABLET, DELAYED RELEASE ORAL at 18:06

## 2017-06-09 RX ADMIN — Medication 650 MILLIGRAM(S): at 18:06

## 2017-06-09 RX ADMIN — Medication 650 MILLIGRAM(S): at 01:01

## 2017-06-09 NOTE — PROGRESS NOTE ADULT - SUBJECTIVE AND OBJECTIVE BOX
INTERVAL HPI/OVERNIGHT EVENTS: no events overnight. Tolerating clear diet. No n/v.     MEDICATIONS  (STANDING):  pantoprazole  Injectable 40milliGRAM(s) IV Push two times a day  atorvastatin 40milliGRAM(s) Oral at bedtime    MEDICATIONS  (PRN):  acetaminophen   Tablet. 650milliGRAM(s) Oral every 6 hours PRN Moderate Pain (4 - 6)  ALPRAZolam 0.5milliGRAM(s) Oral at bedtime PRN Anxiety or insomnia      Allergies    No Known Allergies    Intolerances        ROS: as above     Vital Signs Last 24 Hrs  T(C): 36.8, Max: 36.8 (06-08 @ 22:25)  T(F): 98.3, Max: 98.3 (06-08 @ 22:25)  HR: 79 (72 - 86)  BP: 105/70 (105/70 - 160/84)  BP(mean): --  RR: 18 (18 - 20)  SpO2: 95% (95% - 98%)    Physical Exam:     AAO x 3 NAD  RRR S1S2  CTA b/l   sodt NTND  no c/c/e    LABS:                        9.4    5.81  )-----------( 352      ( 08 Jun 2017 07:26 )             28.6     06-09    139  |  99  |  12  ----------------------------<  102<H>  3.8   |  23  |  0.80    Ca    9.2      09 Jun 2017 08:40    TPro  6.4  /  Alb  3.4  /  TBili  0.9  /  DBili  0.3<H>  /  AST  23  /  ALT  18  /  AlkPhos  107  06-09          RADIOLOGY & ADDITIONAL TESTS:EXAM:  CT ABDOMEN AND PELVIS OC IC                          EXAM:  CT CHEST IC                            PROCEDURE DATE:  06/08/2017            INTERPRETATION:  CLINICAL INFORMATION: Cholangiocarcinoma with concern   for metastatic disease. New duodenal obstruction.    COMPARISON: Chest/abdomen/pelvis CT from 3/6/2017.    PROCEDURE:   CT of the Chest, Abdomen and Pelvis was performed with intravenous   contrast.   Intravenous contrast: 90 ml Omnipaque 350. 10 ml discarded.  Oral contrast: positive contrast was administered.  Sagittal and coronal reformats were performed.    FINDINGS:    CHEST:     LUNGS AND LARGE AIRWAYS: Patent central airways. Left lower lobe   subsegmental atelectasis. 4 mm solid right upper lobe pulmonary nodule   (series 3, image 119). 4 mm right upper lobe solid subpleural nodule   (series 3, image 150). Both unchanged from March 3 6 2017.  Groundglass   opacity in the lingula suggestive of aspiration pneumonia. Bibasilar   atelectasis.  PLEURA: No pleural effusion.  VESSELS: Within normal limits.  HEART: Heart size is borderline in size No pericardial effusion.  MEDIASTINUM AND DIPTI: No lymphadenopathy.  CHEST WALL AND LOWER NECK: Within normal limits. Homogeneous visualized   thyroid.    ABDOMEN AND PELVIS:    LIVER: Within normal limits.  BILE DUCTS: Common bile duct stent and resultant pneumobilia. No   intrahepatic biliary ductal dilatation.  Infiltrating soft tissue along   the stent slightly increased. Consistent with known cholangiocarcinoma.  GALLBLADDER: Surgically absent.  SPLEEN: Within normal limits.  PANCREAS: Mild dilatation pancreatic duct.  ADRENALS: Within normal limits.  KIDNEYS/URETERS: 5 mm lower pole left renal cyst. No renal calculus or   hydronephrosis    BLADDER: Within normallimits.  REPRODUCTIVE ORGANS: The uterus and adnexa are within normal limits.    BOWEL: Markedly edematous gastric wall, most prominently along the lesser   curvature, measuring up to 13 mm in thickness. Apparent luminal narrowing   (2:71) mid secondduodenum. No bowel obstruction. Appendix is normal.  PERITONEUM: No ascites.  VESSELS:  Mildly atheromatous abdominal aorta.  RETROPERITONEUM: No significant lymphadenopathy.    ABDOMINAL WALL: Within normal limits.  BONES: Mild lumbar spine degenerative changes    IMPRESSION:     1.  Edema and thickening of the gastric wall most prominently along the   lesser curvature, likely representing gastritis. Possible etiologies   include infectious and inflammatory causes, including radiation-induced   gastritis.   2.  Unchanged appearance of a common bile duct stent with surrounding   infiltrative tissue consistent with known cholangio-carcinoma. No   definitive evidence for metastatic disease.  3.  Narrowing of the mid second duodenal lumen.                    COBY BEARD M.D., RADIOLOGY RESIDENT  This document has been electronically signed.  MAXWELL MENDEZ M.D., ATTENDING RADIOLOGIST  This document has been electronically signed. Jun 9 2017  1:27PM

## 2017-06-09 NOTE — PROGRESS NOTE ADULT - SUBJECTIVE AND OBJECTIVE BOX
INTERVAL HPI/OVERNIGHT EVENTS:    The patient has no new complaints. Tolerating clears. Nausea has improved. Denies abdominal pain. Pt afebrile.     MEDICATIONS  (STANDING):  pantoprazole  Injectable 40milliGRAM(s) IV Push two times a day  atorvastatin 40milliGRAM(s) Oral at bedtime    MEDICATIONS  (PRN):  acetaminophen   Tablet. 650milliGRAM(s) Oral every 6 hours PRN Moderate Pain (4 - 6)  ALPRAZolam 0.5milliGRAM(s) Oral at bedtime PRN Anxiety or insomnia      Vital Signs Last 24 Hrs  T(C): 36.8, Max: 36.8 (06-08 @ 22:25)  T(F): 98.3, Max: 98.3 (06-08 @ 22:25)  HR: 79 (72 - 86)  BP: 105/70 (105/70 - 160/84)  BP(mean): --  RR: 18 (18 - 20)  SpO2: 95% (95% - 98%)    PHYSICAL EXAM:    Constitutional: NAD, well-developed  Respiratory: CTA and P  Cardiovascular: S1 and S2, RRR, no M  Gastrointestinal: BS+, soft, NT/ND, neg HSM,  Extremities: No peripheral edema, neg clubing, cyanosis      LABS:                        9.4    5.81  )-----------( 352      ( 08 Jun 2017 07:26 )             28.6     06-09    139  |  99  |  12  ----------------------------<  102<H>  3.8   |  23  |  0.80    Ca    9.2      09 Jun 2017 08:40    TPro  6.4  /  Alb  3.4  /  TBili  0.9  /  DBili  0.3<H>  /  AST  23  /  ALT  18  /  AlkPhos  107  06-09        LIVER FUNCTIONS - ( 09 Jun 2017 08:40 )  Alb: 3.4 g/dL / Pro: 6.4 g/dL / ALK PHOS: 107 U/L / ALT: 18 U/L / AST: 23 U/L / GGT: x             RADIOLOGY & ADDITIONAL TESTS:  None today

## 2017-06-09 NOTE — PROGRESS NOTE ADULT - SUBJECTIVE AND OBJECTIVE BOX
CC: Vomiting    HPI: 1 episode of vomiting after drinking contrast for CT. No vomiting overnight. 1 BM yesterday. No pain.      Vital Signs Last 24 Hrs  T(C): 36.6, Max: 36.9 (06-08 @ 15:24)  T(F): 97.9, Max: 98.4 (06-08 @ 15:24)  HR: 86 (72 - 86)  BP: 121/74 (121/74 - 160/84)  BP(mean): --  RR: 20 (18 - 20)  SpO2: 97% (94% - 98%)    Labs:     CBC Full  -  ( 08 Jun 2017 07:26 )  WBC Count : 5.81 K/uL  Hemoglobin : 9.4 g/dL  Hematocrit : 28.6 %  Platelet Count - Automated : 352 K/uL    LIVER FUNCTIONS - ( 09 Jun 2017 08:40 )  Alb: 3.4 g/dL / Pro: 6.4 g/dL / ALK PHOS: 107 U/L / ALT: 18 U/L / AST: 23 U/L / GGT: x           139  |  99  |  12  ----------------------------<  102<H>  3.8   |  23  |  0.80    Ca    9.2      09 Jun 2017 08:40    TPro  6.4  /  Alb  3.4  /  TBili  0.9  /  DBili  0.3<H>  /  AST  23  /  ALT  18  /  AlkPhos  107  06-09      MEDICATIONS  (STANDING):  pantoprazole  Injectable 40milliGRAM(s) IV Push two times a day  atorvastatin 40milliGRAM(s) Oral at bedtime    MEDICATIONS  (PRN):  acetaminophen   Tablet. 650milliGRAM(s) Oral every 6 hours PRN Moderate Pain (4 - 6)  ALPRAZolam 0.5milliGRAM(s) Oral at bedtime PRN Anxiety or insomnia

## 2017-06-09 NOTE — PHYSICAL THERAPY INITIAL EVALUATION ADULT - PERTINENT HX OF CURRENT PROBLEM, REHAB EVAL
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 Pt s/p EGD 6/7.

## 2017-06-09 NOTE — PHYSICAL THERAPY INITIAL EVALUATION ADULT - ADDITIONAL COMMENTS
Pt lives with spouse in private home with 5 stairs to enter (+) HR, first floor setup within. 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 Pt's spouse owns cane, walker, shower chair, w/c.

## 2017-06-09 NOTE — PHYSICAL THERAPY INITIAL EVALUATION ADULT - DISCHARGE DISPOSITION, PT EVAL
no skilled PT needs/home/Home with no skilled PT needs. No AD recommendation. **Pt cleared for d/c home from PT perspective at this time.

## 2017-06-10 LAB
ALBUMIN SERPL ELPH-MCNC: 3.1 G/DL — LOW (ref 3.3–5)
ALP SERPL-CCNC: 100 U/L — SIGNIFICANT CHANGE UP (ref 40–120)
ALT FLD-CCNC: 18 U/L — SIGNIFICANT CHANGE UP (ref 10–45)
ANION GAP SERPL CALC-SCNC: 13 MMOL/L — SIGNIFICANT CHANGE UP (ref 5–17)
AST SERPL-CCNC: 20 U/L — SIGNIFICANT CHANGE UP (ref 10–40)
BILIRUB DIRECT SERPL-MCNC: 0.2 MG/DL — SIGNIFICANT CHANGE UP (ref 0–0.2)
BILIRUB INDIRECT FLD-MCNC: 0.5 MG/DL — SIGNIFICANT CHANGE UP (ref 0.2–1)
BILIRUB SERPL-MCNC: 0.7 MG/DL — SIGNIFICANT CHANGE UP (ref 0.2–1.2)
BUN SERPL-MCNC: 13 MG/DL — SIGNIFICANT CHANGE UP (ref 7–23)
CALCIUM SERPL-MCNC: 9.2 MG/DL — SIGNIFICANT CHANGE UP (ref 8.4–10.5)
CHLORIDE SERPL-SCNC: 102 MMOL/L — SIGNIFICANT CHANGE UP (ref 96–108)
CO2 SERPL-SCNC: 23 MMOL/L — SIGNIFICANT CHANGE UP (ref 22–31)
CREAT SERPL-MCNC: 0.79 MG/DL — SIGNIFICANT CHANGE UP (ref 0.5–1.3)
GLUCOSE SERPL-MCNC: 111 MG/DL — HIGH (ref 70–99)
HCT VFR BLD CALC: 29.8 % — LOW (ref 34.5–45)
HGB BLD-MCNC: 9.4 G/DL — LOW (ref 11.5–15.5)
MCHC RBC-ENTMCNC: 28.4 PG — SIGNIFICANT CHANGE UP (ref 27–34)
MCHC RBC-ENTMCNC: 31.5 GM/DL — LOW (ref 32–36)
MCV RBC AUTO: 90 FL — SIGNIFICANT CHANGE UP (ref 80–100)
PLATELET # BLD AUTO: 363 K/UL — SIGNIFICANT CHANGE UP (ref 150–400)
POTASSIUM SERPL-MCNC: 3.8 MMOL/L — SIGNIFICANT CHANGE UP (ref 3.5–5.3)
POTASSIUM SERPL-SCNC: 3.8 MMOL/L — SIGNIFICANT CHANGE UP (ref 3.5–5.3)
PROT SERPL-MCNC: 5.9 G/DL — LOW (ref 6–8.3)
RBC # BLD: 3.31 M/UL — LOW (ref 3.8–5.2)
RBC # FLD: 15.7 % — HIGH (ref 10.3–14.5)
SODIUM SERPL-SCNC: 138 MMOL/L — SIGNIFICANT CHANGE UP (ref 135–145)
WBC # BLD: 7.67 K/UL — SIGNIFICANT CHANGE UP (ref 3.8–10.5)
WBC # FLD AUTO: 7.67 K/UL — SIGNIFICANT CHANGE UP (ref 3.8–10.5)

## 2017-06-10 PROCEDURE — 99232 SBSQ HOSP IP/OBS MODERATE 35: CPT

## 2017-06-10 RX ADMIN — Medication 650 MILLIGRAM(S): at 05:44

## 2017-06-10 RX ADMIN — PANTOPRAZOLE SODIUM 40 MILLIGRAM(S): 20 TABLET, DELAYED RELEASE ORAL at 17:22

## 2017-06-10 RX ADMIN — ATORVASTATIN CALCIUM 40 MILLIGRAM(S): 80 TABLET, FILM COATED ORAL at 21:11

## 2017-06-10 RX ADMIN — PANTOPRAZOLE SODIUM 40 MILLIGRAM(S): 20 TABLET, DELAYED RELEASE ORAL at 05:46

## 2017-06-10 NOTE — PROGRESS NOTE ADULT - SUBJECTIVE AND OBJECTIVE BOX
Patient is a 76y old  Female who presents with a chief complaint of symptomatic anemia, melena (06 Jun 2017 21:16)      SUBJECTIVE / OVERNIGHT EVENTS:  Denies any overnight events.  States her appetite is decreased but she is taking liquids without nausea, vomiting, abd pain.  Denies CP, SOB, palpitations.  Denies lightheadedness.  Denies urinary symptoms.  Denies weakness.  Denies URI symptoms.  Denies swelling, rashes.      MEDICATIONS  (STANDING):  pantoprazole  Injectable 40milliGRAM(s) IV Push two times a day  atorvastatin 40milliGRAM(s) Oral at bedtime    MEDICATIONS  (PRN):  acetaminophen   Tablet. 650milliGRAM(s) Oral every 6 hours PRN Moderate Pain (4 - 6)  ALPRAZolam 0.5milliGRAM(s) Oral at bedtime PRN Anxiety or insomnia      Vital Signs Last 24 Hrs  T(C): 36.7, Max: 36.8 (06-09 @ 16:06)  HR: 69 (68 - 79)  BP: 119/76 (104/69 - 119/76)  RR: 18 (18 - 18)  SpO2: 95% (94% - 95%)  Wt(kg): --  CAPILLARY BLOOD GLUCOSE    I&O's Summary    I & Os for current day (as of 10 Luis Carlos 2017 11:26)  =============================================  IN: 280 ml / OUT: 1000 ml / NET: -720 ml      PHYSICAL EXAM:  GENERAL: NAD, well-developed, pleasant and well-groomed  HEAD:  Atraumatic, Normocephalic  EYES: EOMI, conjunctiva and sclera clear, op clear  NECK: Supple, No JVD  CHEST/LUNG: Clear to auscultation bilaterally; No wheeze  HEART: Regular rate and rhythm; No murmurs, rubs, or gallops  ABDOMEN: Soft, Nontender, Nondistended; Bowel sounds present  EXTREMITIES:  2+ Peripheral Pulses, No clubbing, cyanosis, or edema  PSYCH: AAOx3  NEUROLOGY: non-focal  SKIN: No rashes or lesions    LABS:                        9.4    7.67  )-----------( 363      ( 10 Luis Carlos 2017 08:13 )             29.8     06-10    138  |  102  |  13  ----------------------------<  111<H>  3.8   |  23  |  0.79    Ca    9.2      10 Luis Carlos 2017 08:01    TPro  5.9<L>  /  Alb  3.1<L>  /  TBili  0.7  /  DBili  0.2  /  AST  20  /  ALT  18  /  AlkPhos  100  06-10              RADIOLOGY & ADDITIONAL TESTS: CT abd/pelvis from 6/8    Imaging Personally Reviewed:  1.  Edema and thickening of the gastric wall most prominently along the   lesser curvature, likely representing gastritis. Possible etiologies   include infectious and inflammatory causes, including radiation-induced   gastritis.   2.  Unchanged appearance of a common bile duct stent with surrounding   infiltrative tissue consistent with known cholangio-carcinoma. No   definitive evidence for metastatic disease.  3.  Narrowing of the mid second duodenal lumen.    Consultant(s) Notes Reviewed:  Heme, GI

## 2017-06-11 ENCOUNTER — APPOINTMENT (OUTPATIENT)
Dept: INFUSION THERAPY | Facility: HOSPITAL | Age: 77
End: 2017-06-11

## 2017-06-11 LAB
BASOPHILS # BLD AUTO: 0.02 K/UL — SIGNIFICANT CHANGE UP (ref 0–0.2)
BASOPHILS NFR BLD AUTO: 0.3 % — SIGNIFICANT CHANGE UP (ref 0–2)
EOSINOPHIL # BLD AUTO: 0.5 K/UL — SIGNIFICANT CHANGE UP (ref 0–0.5)
EOSINOPHIL NFR BLD AUTO: 7.1 % — HIGH (ref 0–6)
HCT VFR BLD CALC: 31 % — LOW (ref 34.5–45)
HGB BLD-MCNC: 9.6 G/DL — LOW (ref 11.5–15.5)
IMM GRANULOCYTES NFR BLD AUTO: 0.3 % — SIGNIFICANT CHANGE UP (ref 0–1.5)
LYMPHOCYTES # BLD AUTO: 1.05 K/UL — SIGNIFICANT CHANGE UP (ref 1–3.3)
LYMPHOCYTES # BLD AUTO: 14.9 % — SIGNIFICANT CHANGE UP (ref 13–44)
MCHC RBC-ENTMCNC: 27.9 PG — SIGNIFICANT CHANGE UP (ref 27–34)
MCHC RBC-ENTMCNC: 31 GM/DL — LOW (ref 32–36)
MCV RBC AUTO: 90.1 FL — SIGNIFICANT CHANGE UP (ref 80–100)
MONOCYTES # BLD AUTO: 0.78 K/UL — SIGNIFICANT CHANGE UP (ref 0–0.9)
MONOCYTES NFR BLD AUTO: 11 % — SIGNIFICANT CHANGE UP (ref 2–14)
NEUTROPHILS # BLD AUTO: 4.7 K/UL — SIGNIFICANT CHANGE UP (ref 1.8–7.4)
NEUTROPHILS NFR BLD AUTO: 66.4 % — SIGNIFICANT CHANGE UP (ref 43–77)
PLATELET # BLD AUTO: 375 K/UL — SIGNIFICANT CHANGE UP (ref 150–400)
RBC # BLD: 3.44 M/UL — LOW (ref 3.8–5.2)
RBC # FLD: 15.8 % — HIGH (ref 10.3–14.5)
WBC # BLD: 7.07 K/UL — SIGNIFICANT CHANGE UP (ref 3.8–10.5)
WBC # FLD AUTO: 7.07 K/UL — SIGNIFICANT CHANGE UP (ref 3.8–10.5)

## 2017-06-11 PROCEDURE — 99232 SBSQ HOSP IP/OBS MODERATE 35: CPT | Mod: GC

## 2017-06-11 RX ADMIN — PANTOPRAZOLE SODIUM 40 MILLIGRAM(S): 20 TABLET, DELAYED RELEASE ORAL at 18:36

## 2017-06-11 RX ADMIN — Medication 650 MILLIGRAM(S): at 06:26

## 2017-06-11 RX ADMIN — ATORVASTATIN CALCIUM 40 MILLIGRAM(S): 80 TABLET, FILM COATED ORAL at 21:11

## 2017-06-11 RX ADMIN — PANTOPRAZOLE SODIUM 40 MILLIGRAM(S): 20 TABLET, DELAYED RELEASE ORAL at 06:27

## 2017-06-11 NOTE — PROGRESS NOTE ADULT - SUBJECTIVE AND OBJECTIVE BOX
Patient is a 76y old  Female who presents with a chief complaint of anemia (06 Jun 2017 21:16)      SUBJECTIVE / OVERNIGHT EVENTS: no aeon, no n/v/diarrhea. tolerating clears. denies fevers/chills.     MEDICATIONS  (STANDING):  pantoprazole  Injectable 40milliGRAM(s) IV Push two times a day  atorvastatin 40milliGRAM(s) Oral at bedtime    MEDICATIONS  (PRN):  acetaminophen   Tablet. 650milliGRAM(s) Oral every 6 hours PRN Moderate Pain (4 - 6)  ALPRAZolam 0.5milliGRAM(s) Oral at bedtime PRN Anxiety or insomnia      Vital Signs Last 24 Hrs  T(C): 36.6, Max: 36.6 (06-10 @ 15:20)  T(F): 97.8, Max: 97.9 (06-10 @ 15:20)  HR: 72 (72 - 74)  BP: 125/73 (102/63 - 125/73)  BP(mean): --  RR: 20 (18 - 20)  SpO2: 96% (95% - 97%)  CAPILLARY BLOOD GLUCOSE    I&O's Summary  I & Os for 24h ending 11 Jun 2017 07:00  =============================================  IN: 140 ml / OUT: 350 ml / NET: -210 ml    I & Os for current day (as of 11 Jun 2017 10:08)  =============================================  IN: 220 ml / OUT: 0 ml / NET: 220 ml      PHYSICAL EXAM:  CONSTIPATION: NAD, well-developed  HEAD:  Atraumatic, Normocephalic  EYES: EOMI, PERRLA, conjunctiva and sclera clear  ENMT: Supple, No JVD  RESPIRATORY: Clear to auscultation bilaterally; No wheeze  CARDIOVASCULAR: Regular rate and rhythm; No murmurs, rubs, or gallops  GI: Soft, Nontender, Nondistended; Bowel sounds present  EXTREMITIES:  2+ Peripheral Pulses, No clubbing, cyanosis, or edema  PSYCH: AAOx3  NEUROLOGY: non-focal  SKIN: No rashes or lesions    LABS:                        9.6    7.07  )-----------( 375      ( 11 Jun 2017 09:35 )             31.0     06-10    138  |  102  |  13  ----------------------------<  111<H>  3.8   |  23  |  0.79    Ca    9.2      10 Luis Carlos 2017 08:01    TPro  5.9<L>  /  Alb  3.1<L>  /  TBili  0.7  /  DBili  0.2  /  AST  20  /  ALT  18  /  AlkPhos  100  06-10              RADIOLOGY & ADDITIONAL TESTS:    Imaging Personally Reviewed:    Consultant(s) Notes Reviewed:      Care Discussed with Consultants/Other Providers:

## 2017-06-12 PROCEDURE — 99232 SBSQ HOSP IP/OBS MODERATE 35: CPT

## 2017-06-12 RX ORDER — SODIUM CHLORIDE 9 MG/ML
1000 INJECTION INTRAMUSCULAR; INTRAVENOUS; SUBCUTANEOUS
Qty: 0 | Refills: 0 | Status: DISCONTINUED | OUTPATIENT
Start: 2017-06-12 | End: 2017-06-13

## 2017-06-12 RX ADMIN — PANTOPRAZOLE SODIUM 40 MILLIGRAM(S): 20 TABLET, DELAYED RELEASE ORAL at 17:49

## 2017-06-12 RX ADMIN — ATORVASTATIN CALCIUM 40 MILLIGRAM(S): 80 TABLET, FILM COATED ORAL at 21:29

## 2017-06-12 RX ADMIN — Medication 650 MILLIGRAM(S): at 23:19

## 2017-06-12 RX ADMIN — PANTOPRAZOLE SODIUM 40 MILLIGRAM(S): 20 TABLET, DELAYED RELEASE ORAL at 05:45

## 2017-06-12 RX ADMIN — SODIUM CHLORIDE 75 MILLILITER(S): 9 INJECTION INTRAMUSCULAR; INTRAVENOUS; SUBCUTANEOUS at 17:49

## 2017-06-12 RX ADMIN — Medication 650 MILLIGRAM(S): at 21:30

## 2017-06-12 NOTE — PROGRESS NOTE ADULT - SUBJECTIVE AND OBJECTIVE BOX
CC: Vomiting    HPI: No vomiting over the weekend. Feels well      Vital Signs Last 24 Hrs  T(C): 36.5, Max: 36.8 (06-11 @ 20:54)  T(F): 97.7, Max: 98.3 (06-11 @ 20:54)  HR: 76 (76 - 79)  BP: 136/88 (113/67 - 149/75)  BP(mean): --  RR: 18 (18 - 18)  SpO2: 95% (95% - 97%)    CBC Full  -  ( 11 Jun 2017 09:35 )  WBC Count : 7.07 K/uL  Hemoglobin : 9.6 g/dL  Hematocrit : 31.0 %  Platelet Count - Automated : 375 K/uL    MEDICATIONS  (STANDING):  pantoprazole  Injectable 40milliGRAM(s) IV Push two times a day  atorvastatin 40milliGRAM(s) Oral at bedtime  sodium chloride 0.9%. 1000milliLiter(s) IV Continuous <Continuous>    MEDICATIONS  (PRN):  acetaminophen   Tablet. 650milliGRAM(s) Oral every 6 hours PRN Moderate Pain (4 - 6)  ALPRAZolam 0.5milliGRAM(s) Oral at bedtime PRN Anxiety or insomnia

## 2017-06-12 NOTE — PROGRESS NOTE ADULT - SUBJECTIVE AND OBJECTIVE BOX
Pre-Endoscopy Evaluation      Referring Physician: Yuli Myers                                   Procedure: EGD with Duodenal stent placement    Indication for Procedure: Malignant Gastric Outlet Obstruction    Pertinent History: 77 yo female with hx of Cholangiocarcinoma- s/p partial resection, chemo/XRT, a/w melenic stools, acute blood loss anema-s/p EGD 6/7, findings suggestive of GOO    Sedation by Anesthesia [ ]    PAST MEDICAL & SURGICAL HISTORY:  Current use of aspirin  Difficult extubation: s/p ERCP, hypotension  Malignant neoplasm of biliary tract  Arrhythmia: s/p ERCP, irregular heart beat  Biliary obstruction  Anxiety  Hyperlipidemia  Hypertension  Diabetes mellitus  History of cholecystectomy  S/P ERCP: 12/2015  History of biliary stent insertion: 12/2015  No significant past surgical history      PMH of Gastroparesis [ ]  Gastric Surgery [ ]  Gastric Outlet Obstruction [x ]    Allergies:    No Known Allergies    Intolerances: none    Latex allergy: [ ] yes [x ] no    Medications:MEDICATIONS  (STANDING):  pantoprazole  Injectable 40milliGRAM(s) IV Push two times a day  atorvastatin 40milliGRAM(s) Oral at bedtime  sodium chloride 0.9%. 1000milliLiter(s) IV Continuous <Continuous>    MEDICATIONS  (PRN):  acetaminophen   Tablet. 650milliGRAM(s) Oral every 6 hours PRN Moderate Pain (4 - 6)  ALPRAZolam 0.5milliGRAM(s) Oral at bedtime PRN Anxiety or insomnia      Smoking: [ ] yes  [ x] no    AICD/PPM: [ ] yes   [x ] no    Pertinent lab data:                        9.6    7.07  )-----------( 375      ( 11 Jun 2017 09:35 )             31.0       RADIOLOGY RESULTS:      Physical Examination:       Daily   Vital Signs Last 24 Hrs  T(C): 36.5, Max: 36.8 (06-11 @ 20:54)  T(F): 97.7, Max: 98.3 (06-11 @ 20:54)  HR: 76 (76 - 79)  BP: 136/88 (113/67 - 149/75)  BP(mean): --  RR: 18 (18 - 18)  SpO2: 95% (95% - 97%)    BP:                 HR:                  SPO2:               Temperature:    Constitutional: NAD    HEENT: PERRLA, EOMI,       Neck:  No JVD    Respiratory: CTAB/L    Cardiovascular: S1 and S2    Gastrointestinal: BS+, soft, NT/ND    Extremities: No peripheral edema    Neurological: A/O x 3, no focal deficits    Psychiatric: Normal mood, normal affect    : No Smith    Skin: No rashes    Comments:    ASA Class: I [ ]  II [ ]  III [x ]  IV [ ]    The patient is a suitable candidate for the planned procedure unless box checked [ ]  No, explain: Pre-Endoscopy Evaluation      Referring Physician: Yuli Myers                                   Procedure: EGD with Duodenal stent placement    Indication for Procedure: Malignant Gastric Outlet Obstruction    Pertinent History: 77 yo female with hx of Cholangiocarcinoma- s/p partial resection, chemo/XRT, a/w melenic stools, acute blood loss anema-s/p EGD 6/7, findings suggestive of GOO    Sedation by Anesthesia [ ]    PAST MEDICAL & SURGICAL HISTORY:  Current use of aspirin  Difficult extubation: s/p ERCP, hypotension  Malignant neoplasm of biliary tract  Arrhythmia: s/p ERCP, irregular heart beat  Biliary obstruction  Anxiety  Hyperlipidemia  Hypertension  Diabetes mellitus  History of cholecystectomy  S/P ERCP: 12/2015  History of biliary stent insertion: 12/2015  No significant past surgical history      PMH of Gastroparesis [ ]  Gastric Surgery [ ]  Gastric Outlet Obstruction [x ]    Allergies:    No Known Allergies    Intolerances: none    Latex allergy: [ ] yes [x ] no    Medications:MEDICATIONS  (STANDING):  pantoprazole  Injectable 40milliGRAM(s) IV Push two times a day  atorvastatin 40milliGRAM(s) Oral at bedtime  sodium chloride 0.9%. 1000milliLiter(s) IV Continuous <Continuous>    MEDICATIONS  (PRN):  acetaminophen   Tablet. 650milliGRAM(s) Oral every 6 hours PRN Moderate Pain (4 - 6)  ALPRAZolam 0.5milliGRAM(s) Oral at bedtime PRN Anxiety or insomnia      Smoking: [ ] yes  [ x] no    AICD/PPM: [ ] yes   [x ] no    Pertinent lab data:                        9.6    7.07  )-----------( 375      ( 11 Jun 2017 09:35 )             31.0     Echo:  PROCEDURE: Transthoracic echocardiogram with 2-D, M-Mode  and complete spectral and color flow Doppler.  INDICATION: Chronic atrial fibrillation (I48.2)    Conclusions:  1. Mitral annular calcification, otherwise normal mitral  valve. Mild mitral regurgitation.  2. Normal left ventricular internal dimensions and wall  thicknesses.  3. Normal left ventricular systolic function. No segmental  wall motion abnormalities.  ------------------------------------------------------------------------  Confirmed on  2/22/2016 - 17:27:31 by Latha Hopkins M.D.  ------------------------------------------------------------------------    Physical Examination:       Daily   Vital Signs Last 24 Hrs  T(C): 36.5, Max: 36.8 (06-11 @ 20:54)  T(F): 97.7, Max: 98.3 (06-11 @ 20:54)  HR: 76 (76 - 79)  BP: 136/88 (113/67 - 149/75)  BP(mean): --  RR: 18 (18 - 18)  SpO2: 95% (95% - 97%)    BP: 146/84     HR: 88         SPO2:  97% (r/a)  Temperature: 36.9    Constitutional: NAD    HEENT: PERRLA, EOMI,       Neck:  No JVD    Respiratory: CTAB/L    Cardiovascular: S1 and S2    Gastrointestinal: BS+, soft, NT/ND    Extremities: No peripheral edema    Neurological: A/O x 3, no focal deficits    Psychiatric: Normal mood, normal affect    : No Smith    Skin: No rashes    Comments:    ASA Class: I [ ]  II [ ]  III [x ]  IV [ ]    The patient is a suitable candidate for the planned procedure unless box checked [ ]  No, explain:

## 2017-06-13 VITALS — WEIGHT: 141.98 LBS

## 2017-06-13 LAB
ANION GAP SERPL CALC-SCNC: 13 MMOL/L — SIGNIFICANT CHANGE UP (ref 5–17)
BUN SERPL-MCNC: 10 MG/DL — SIGNIFICANT CHANGE UP (ref 7–23)
CALCIUM SERPL-MCNC: 8.5 MG/DL — SIGNIFICANT CHANGE UP (ref 8.4–10.5)
CHLORIDE SERPL-SCNC: 107 MMOL/L — SIGNIFICANT CHANGE UP (ref 96–108)
CO2 SERPL-SCNC: 19 MMOL/L — LOW (ref 22–31)
CREAT SERPL-MCNC: 0.71 MG/DL — SIGNIFICANT CHANGE UP (ref 0.5–1.3)
GLUCOSE SERPL-MCNC: 111 MG/DL — HIGH (ref 70–99)
HCT VFR BLD CALC: 29.8 % — LOW (ref 34.5–45)
HGB BLD-MCNC: 9.4 G/DL — LOW (ref 11.5–15.5)
MCHC RBC-ENTMCNC: 28.4 PG — SIGNIFICANT CHANGE UP (ref 27–34)
MCHC RBC-ENTMCNC: 31.5 GM/DL — LOW (ref 32–36)
MCV RBC AUTO: 90 FL — SIGNIFICANT CHANGE UP (ref 80–100)
PLATELET # BLD AUTO: 344 K/UL — SIGNIFICANT CHANGE UP (ref 150–400)
POTASSIUM SERPL-MCNC: 4.4 MMOL/L — SIGNIFICANT CHANGE UP (ref 3.5–5.3)
POTASSIUM SERPL-SCNC: 4.4 MMOL/L — SIGNIFICANT CHANGE UP (ref 3.5–5.3)
RBC # BLD: 3.31 M/UL — LOW (ref 3.8–5.2)
RBC # FLD: 15.6 % — HIGH (ref 10.3–14.5)
SODIUM SERPL-SCNC: 139 MMOL/L — SIGNIFICANT CHANGE UP (ref 135–145)
WBC # BLD: 8.69 K/UL — SIGNIFICANT CHANGE UP (ref 3.8–10.5)
WBC # FLD AUTO: 8.69 K/UL — SIGNIFICANT CHANGE UP (ref 3.8–10.5)

## 2017-06-13 PROCEDURE — 93005 ELECTROCARDIOGRAM TRACING: CPT

## 2017-06-13 PROCEDURE — 85014 HEMATOCRIT: CPT

## 2017-06-13 PROCEDURE — 86923 COMPATIBILITY TEST ELECTRIC: CPT

## 2017-06-13 PROCEDURE — 82728 ASSAY OF FERRITIN: CPT

## 2017-06-13 PROCEDURE — 84295 ASSAY OF SERUM SODIUM: CPT

## 2017-06-13 PROCEDURE — 83550 IRON BINDING TEST: CPT

## 2017-06-13 PROCEDURE — 84132 ASSAY OF SERUM POTASSIUM: CPT

## 2017-06-13 PROCEDURE — 82272 OCCULT BLD FECES 1-3 TESTS: CPT

## 2017-06-13 PROCEDURE — C1876: CPT

## 2017-06-13 PROCEDURE — 36430 TRANSFUSION BLD/BLD COMPNT: CPT

## 2017-06-13 PROCEDURE — C1889: CPT

## 2017-06-13 PROCEDURE — 97161 PT EVAL LOW COMPLEX 20 MIN: CPT

## 2017-06-13 PROCEDURE — 80076 HEPATIC FUNCTION PANEL: CPT

## 2017-06-13 PROCEDURE — 82435 ASSAY OF BLOOD CHLORIDE: CPT

## 2017-06-13 PROCEDURE — 86850 RBC ANTIBODY SCREEN: CPT

## 2017-06-13 PROCEDURE — P9016: CPT

## 2017-06-13 PROCEDURE — 99239 HOSP IP/OBS DSCHRG MGMT >30: CPT

## 2017-06-13 PROCEDURE — 80048 BASIC METABOLIC PNL TOTAL CA: CPT

## 2017-06-13 PROCEDURE — 85610 PROTHROMBIN TIME: CPT

## 2017-06-13 PROCEDURE — 83615 LACTATE (LD) (LDH) ENZYME: CPT

## 2017-06-13 PROCEDURE — 74328 X-RAY BILE DUCT ENDOSCOPY: CPT

## 2017-06-13 PROCEDURE — 80053 COMPREHEN METABOLIC PANEL: CPT

## 2017-06-13 PROCEDURE — C1769: CPT

## 2017-06-13 PROCEDURE — 71045 X-RAY EXAM CHEST 1 VIEW: CPT

## 2017-06-13 PROCEDURE — 86900 BLOOD TYPING SEROLOGIC ABO: CPT

## 2017-06-13 PROCEDURE — 82803 BLOOD GASES ANY COMBINATION: CPT

## 2017-06-13 PROCEDURE — 83605 ASSAY OF LACTIC ACID: CPT

## 2017-06-13 PROCEDURE — 82330 ASSAY OF CALCIUM: CPT

## 2017-06-13 PROCEDURE — 74177 CT ABD & PELVIS W/CONTRAST: CPT

## 2017-06-13 PROCEDURE — 82947 ASSAY GLUCOSE BLOOD QUANT: CPT

## 2017-06-13 PROCEDURE — 86901 BLOOD TYPING SEROLOGIC RH(D): CPT

## 2017-06-13 PROCEDURE — 85730 THROMBOPLASTIN TIME PARTIAL: CPT

## 2017-06-13 PROCEDURE — 85027 COMPLETE CBC AUTOMATED: CPT

## 2017-06-13 PROCEDURE — 88305 TISSUE EXAM BY PATHOLOGIST: CPT

## 2017-06-13 PROCEDURE — 99285 EMERGENCY DEPT VISIT HI MDM: CPT | Mod: 25

## 2017-06-13 PROCEDURE — 83010 ASSAY OF HAPTOGLOBIN QUANT: CPT

## 2017-06-13 PROCEDURE — 96374 THER/PROPH/DIAG INJ IV PUSH: CPT

## 2017-06-13 PROCEDURE — 71260 CT THORAX DX C+: CPT

## 2017-06-13 RX ORDER — FAMOTIDINE 10 MG/ML
1 INJECTION INTRAVENOUS
Qty: 0 | Refills: 0 | COMMUNITY

## 2017-06-13 RX ORDER — LIPASE/PROTEASE/AMYLASE 16-48-48K
1 CAPSULE,DELAYED RELEASE (ENTERIC COATED) ORAL
Qty: 0 | Refills: 0 | COMMUNITY

## 2017-06-13 RX ORDER — ACETAMINOPHEN 500 MG
2 TABLET ORAL
Qty: 0 | Refills: 0 | COMMUNITY
Start: 2017-06-13

## 2017-06-13 RX ORDER — METOPROLOL TARTRATE 50 MG
1 TABLET ORAL
Qty: 0 | Refills: 0 | COMMUNITY

## 2017-06-13 RX ORDER — METOPROLOL TARTRATE 50 MG
1 TABLET ORAL
Qty: 0 | Refills: 0 | COMMUNITY
Start: 2017-06-13

## 2017-06-13 RX ADMIN — PANTOPRAZOLE SODIUM 40 MILLIGRAM(S): 20 TABLET, DELAYED RELEASE ORAL at 05:24

## 2017-06-13 NOTE — DIETITIAN INITIAL EVALUATION ADULT. - PROBLEM SELECTOR PLAN 1
Hgb of 6.1  on admission (from baseline Hgb of ~12). Guaic positive in ED, and history of melanotic stool concerning for GIB. Most likely 2/2 upper GI source (differential dx: peptic ulcer disease vs erosive gastritis (2/2 radiation effect?) vs AVM/ ectasia vs 2/2 malignancy). S/p 2U PRBC in ED.   - keep NPO for now; GI consult in a.m.; pt likely benefit from EGD +/- colonoscopy   - c/w protonix 40 IV BID likely in setting of UGIB   - f/u post-transfusion CBC   - c/t trend Hgb [id number]; transfuse for Hgb<7.0   - hold home ASA and avoid A/C  - NS IVF @ 75cc/hr while NPO

## 2017-06-13 NOTE — DISCHARGE NOTE ADULT - MEDICATION SUMMARY - MEDICATIONS TO STOP TAKING
I will STOP taking the medications listed below when I get home from the hospital:    valsartan 160 mg oral capsule  --  by mouth once a day    metFORMIN 500 mg oral tablet  --  by mouth once a day    Creon 24,000 units oral delayed release capsule  -- 1 cap(s) by mouth 3 times a day with each meal

## 2017-06-13 NOTE — CHART NOTE - NSCHARTNOTEFT_GEN_A_CORE
Requested by AVNI Lucas to review full liquid diet c pt prior to DC. Reviewed diet c pt and son at bedside, discussed taking supplements to make sure she is able to meet her prot and calorie needs.     RD remains available.   Opal Addison, MS, RD, , [facility] #[phone]

## 2017-06-13 NOTE — DISCHARGE NOTE ADULT - CARE PLAN
Principal Discharge DX:	Biliary obstruction  Goal:	improved  Instructions for follow-up, activity and diet:	pt had a duodenal stent, please follow up with Dr. Taylor in 4-5 days regarding follow up care and advancing diet. Continue full liquid diet . notify MD if nausea and vomiting. please follow up with  and Dr. Valdez for any further treatments and management .  Secondary Diagnosis:	Essential hypertension  Instructions for follow-up, activity and diet:	continue only metoprolol, Follow up with your medical doctor to establish long term blood pressure treatment goals.  Secondary Diagnosis:	Malignant neoplasm of biliary tract  Instructions for follow-up, activity and diet:	follow up with Dr. Smith and Dr Valdez for further treatment and plan  Secondary Diagnosis:	Diabetes mellitus  Instructions for follow-up, activity and diet:	hold metformin and follow up pmd regarding diabetes medication. HgA1C this admission.  Make sure you get your HgA1c checked every three months.  If you take oral diabetes medications, check your blood glucose two times a day.  If you take insulin, check your blood glucose before meals and at bedtime.  It's important not to skip any meals.  Keep a log of your blood glucose results and always take it with you to your doctor appointments.  Keep a list of your current medications including injectables and over the counter medications and bring this medication list with you to all your doctor appointments.  If you have not seen your ophthalmologist this year call for appointment.  Check your feet daily for redness, sores, or openings. Do not self treat. If no improvement in two days call your primary care physician for an appointment.  Low blood sugar (hypoglycemia) is a blood sugar below 70mg/dl. Check your blood sugar if you feel signs/symptoms of hypoglycemia. If your blood sugar is below 70 take 15 grams of carbohydrates (ex 4 oz of apple juice, 3-4 glucose tablets, or 4-6 oz of regular soda) wait 15 minutes and repeat blood sugar to make sure it comes up above 70.  If your blood sugar is above 70 and you are due for a meal, have a meal.  If you are not due for a meal have a snack.  This snack helps keeps your blood sugar at a safe range. Principal Discharge DX:	Gastric out let obstruction  Goal:	improved  Instructions for follow-up, activity and diet:	pt had a duodenal stent, please follow up with Dr. Taylor in 4-5 days regarding follow up care and advancing diet. Continue full liquid diet . notify MD if nausea and vomiting. please follow up with  and Dr. Valdez for any further treatments and management .  Secondary Diagnosis:	Essential hypertension  Instructions for follow-up, activity and diet:	continue only metoprolol, Follow up with your medical doctor to establish long term blood pressure treatment goals.  Secondary Diagnosis:	Malignant neoplasm of biliary tract  Instructions for follow-up, activity and diet:	follow up with Dr. Smith and Dr Valdez for further treatment and plan  Secondary Diagnosis:	Diabetes mellitus  Instructions for follow-up, activity and diet:	hold metformin and follow up pmd regarding diabetes medication. HgA1C this admission.  Make sure you get your HgA1c checked every three months.  If you take oral diabetes medications, check your blood glucose two times a day.  If you take insulin, check your blood glucose before meals and at bedtime.  It's important not to skip any meals.  Keep a log of your blood glucose results and always take it with you to your doctor appointments.  Keep a list of your current medications including injectables and over the counter medications and bring this medication list with you to all your doctor appointments.  If you have not seen your ophthalmologist this year call for appointment.  Check your feet daily for redness, sores, or openings. Do not self treat. If no improvement in two days call your primary care physician for an appointment.  Low blood sugar (hypoglycemia) is a blood sugar below 70mg/dl. Check your blood sugar if you feel signs/symptoms of hypoglycemia. If your blood sugar is below 70 take 15 grams of carbohydrates (ex 4 oz of apple juice, 3-4 glucose tablets, or 4-6 oz of regular soda) wait 15 minutes and repeat blood sugar to make sure it comes up above 70.  If your blood sugar is above 70 and you are due for a meal, have a meal.  If you are not due for a meal have a snack.  This snack helps keeps your blood sugar at a safe range.

## 2017-06-13 NOTE — DIETITIAN INITIAL EVALUATION ADULT. - NS AS NUTRI INTERV MEDICAL AND FOOD SUPPLEMENTS
pt to try different flavor of Ensure Clear, if she does not like it she is willing to have Promote supplement more often as she does like the taste of it.

## 2017-06-13 NOTE — PROGRESS NOTE ADULT - MUSCULOSKELETAL
No joint pain, swelling or deformity; no limitation of movement

## 2017-06-13 NOTE — PROGRESS NOTE ADULT - PROVIDER SPECIALTY LIST ADULT
Gastroenterology
Heme/Onc
Heme/Onc
Internal Medicine
Internal Medicine
Gastroenterology

## 2017-06-13 NOTE — PROGRESS NOTE ADULT - ASSESSMENT
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 Upper endoscopy demonstrated ulceration in the second portion of duodenum which is possibly the source of GI bleeding.  Biopsies of ulcerated tissue negative for neoplasm. There was also severe stricturing distal to biliary stent, which may be secondary to radiation changes. H/H is stable.   EGD also demonstrated large amount of food and fluid in stomach concerning for gastric outlet obstruction. Pt has nausea today and when asked about history of nausea/vomiting at home, patient states vomits a "few times". Pt may benefit from duodenal stent placement
Pt with Gastric outlet obstruction
75 yo with cholangioca s/p resection, radiation and chemo aw GI bleed. Found to have duodenal ulcer and gastric outlet obstruction
76 F w/ h/o GB CA s/p surgery, Chemo/RT now a/w GIB found to have duodenal ulcer and GOO
76 F w/ h/o GB CA s/p surgery, Chemo/RT now a/w GIB found to have duodenal ulcer and GOO
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 Upper endoscopy demonstrated severe ulceration duodenum, likely from radiation, which is possibly the source of GI bleeding.  Biopsies of ulcerated tissue negative for neoplasm. There was also severe stricturing s/p duodenal stent placement.    Patient tolerating liquid diet.   - No objections to d/c home today.   - Continue PPI PO BID  - Follow-up with Dr. Oswaldo Taylor in 1 week.
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 Upper endoscopy demonstrated ulceration in the second portion of duodenum which is possibly the source of GI bleeding.  Biopsies of ulcerated tissue negative for neoplasm. There was also severe stricturing distal to biliary stent, which may be secondary to radiation changes or neoplastic. H/H is stable.   EGD also demonstrated large amount of food and fluid in stomach concerning for gastric outlet obstruction. Plan for upper endoscopy with duodenal stent placement on Monday.
77 yo with cholangioca s/p resection, radiation and chemo aw GI bleed. Found to have duodenal ulcer and gastric outlet obstruction
Pt with Gastric outlet obstruction
77 yo with cholangiocarcinoma with
75 yo with cholangioca s/p resection, radiation and chemo aw GI bleed. Found to have duodenal ulcer and gastric outlet obstruction
75 yo with cholangiocarcinoma aw black stools, anemia of 6.0.

## 2017-06-13 NOTE — PROGRESS NOTE ADULT - PROBLEM SELECTOR PROBLEM 3
Duodenal ulcer
Acute GI bleeding
Acute GI bleeding
Cholangiocarcinoma
Other iron deficiency anemia
Anemia
Cholangiocarcinoma
Duodenal ulcer
Acute GI bleeding
Acute GI bleeding
Cholangiocarcinoma

## 2017-06-13 NOTE — DISCHARGE NOTE ADULT - CARE PROVIDERS DIRECT ADDRESSES
,DirectAddress_Unknown,[email or web address]

## 2017-06-13 NOTE — DIETITIAN INITIAL EVALUATION ADULT. - ORAL INTAKE PTA
pt reports for about 3 weeks PTA she was experiencing N+V at times c eating and when she would vomit, it would include undigested foods; pt reports she was still eating well at that time because she knew she needed to eat; pt reports usual intake: breakfast:  juice, toast or oatmeal, at times just a Boost; lunch: depended on how she was feeling, at times just a bagel or toast and jam; dinner: frozen vegetables or chicken nuggets and low fat mac and cheese or other protein and starch combo; pt reports during the day she consumes water, tea and at times diet soda; reports after having chemo there are certain foods she can no longer enjoy such as fruit and yogurt or ice cream/fair

## 2017-06-13 NOTE — PROGRESS NOTE ADULT - PROBLEM SELECTOR PLAN 2
Resolved, Hb stable
2/2 radiation stenosis, s/p duodenal stent placement yesterday
Benign per biopsy report, no evidence of cholangiocarcinoma
Benign per biopsy report, no evidence of cholangiocarcinoma
Continue PPI IV BID
Continue PPI BID
d/w GI. Plan for stent early next week   Likely related to radiation stenosis.
Resolved, Hb stable
Follow up biopsy results. Continue PPI
Benign per biopsy report
S/p 2 units of PRBCs. Monitor Hb

## 2017-06-13 NOTE — PROGRESS NOTE ADULT - EYES
EOMI; PERRL; no drainage or redness

## 2017-06-13 NOTE — DISCHARGE NOTE ADULT - PLAN OF CARE
improved pt had a duodenal stent, please follow up with Dr. Taylor in 4-5 days regarding follow up care and advancing diet. Continue full liquid diet . notify MD if nausea and vomiting. please follow up with  and Dr. Valdez for any further treatments and management . continue only metoprolol, Follow up with your medical doctor to establish long term blood pressure treatment goals. follow up with Dr. Smith and Dr Valdez for further treatment and plan hold metformin and follow up pmd regarding diabetes medication. HgA1C this admission.  Make sure you get your HgA1c checked every three months.  If you take oral diabetes medications, check your blood glucose two times a day.  If you take insulin, check your blood glucose before meals and at bedtime.  It's important not to skip any meals.  Keep a log of your blood glucose results and always take it with you to your doctor appointments.  Keep a list of your current medications including injectables and over the counter medications and bring this medication list with you to all your doctor appointments.  If you have not seen your ophthalmologist this year call for appointment.  Check your feet daily for redness, sores, or openings. Do not self treat. If no improvement in two days call your primary care physician for an appointment.  Low blood sugar (hypoglycemia) is a blood sugar below 70mg/dl. Check your blood sugar if you feel signs/symptoms of hypoglycemia. If your blood sugar is below 70 take 15 grams of carbohydrates (ex 4 oz of apple juice, 3-4 glucose tablets, or 4-6 oz of regular soda) wait 15 minutes and repeat blood sugar to make sure it comes up above 70.  If your blood sugar is above 70 and you are due for a meal, have a meal.  If you are not due for a meal have a snack.  This snack helps keeps your blood sugar at a safe range.

## 2017-06-13 NOTE — DISCHARGE NOTE ADULT - CARE PROVIDER_API CALL
Oswaldo Taylor), Gastroenterology  [address]  [place] [address]  Phone: [phone]  Fax: [phone]    Adama Smith), Hematology; Internal Medicine; Medical Oncology  [address] [place] [address]  Phone: [phone]  Fax: [phone]    Avi Valdez), Radiation Oncology  [address] [place] [address] [place] [address]  Phone: [phone]  Fax: [phone]    Vaibhav Aquino), Family Medicine; Geriatric Medicine  [address] [place] [address]  Phone: [phone]  Fax: [phone]

## 2017-06-13 NOTE — DIETITIAN INITIAL EVALUATION ADULT. - OTHER INFO
pt seen for LOS and inadequate diet. pt reports last year she had been around 171 pounds before chemo and radiation. States she has lost wt over time and her wt PTA was about 140 pounds, states this has been stable for a few months. pt reports she takes Boost supplement 1-2 times daily depending on how she is eating. Reports NKFA. States she was taking multivitamin and calcium supplements at home.

## 2017-06-13 NOTE — PROGRESS NOTE ADULT - PROBLEM SELECTOR PROBLEM 4
Cholangiocarcinoma

## 2017-06-13 NOTE — DIETITIAN INITIAL EVALUATION ADULT. - NS AS NUTRI INTERV MEALS SNACK
As medically feasible, advance po diet to diabetic full liquids and then to consistent CHO diet as tolerated.

## 2017-06-13 NOTE — DISCHARGE NOTE ADULT - HOSPITAL COURSE
MD 76F with cholangiocarcinoma,, s/p partial resection followed by 5 cycles of Gemzar/Cisplatin, then radiation completed in Dec 2016 pw weakness and tarry stools.     Found to be anemic at 6.1. Transfused 2 units.     EGD done by GI- revealed duodenal ucler and severe duodenal stricture causing Gastric outlet obstruction. Pt continued on PPI, Hb monitored. Tolerated clears.    Duodenal stent placement scheduled, placed on 6/12/17. Diet advanced to full liquids.     CT chest abdomen/pelvis negative for metastatic disease. Bx revealed benign ulcer. Stricture likely secondary to radiation.     Per GI Dr Taylor, to go home on full liquids and follow up as an outpatient.     Diagnoses: Gastric outlet obstruction; Acute GI bleeding; Duodenal ulcer; Cholangiocarcinoma; Nausea and vomiting; Dehydration; HTN; DM-2 76F with cholangiocarcinoma,, s/p partial resection followed by 5 cycles of Gemzar/Cisplatin, then radiation completed in Dec 2016 pw weakness and tarry stools.     Found to be anemic at 6.1. Transfused 2 units.     EGD done by GI- revealed duodenal ucler and severe duodenal stricture causing Gastric outlet obstruction. Pt continued on PPI, Hb monitored. Tolerated clears.    Duodenal stent placement scheduled, placed on 6/12/17. Diet advanced to full liquids.     CT chest abdomen/pelvis negative for metastatic disease. Bx revealed benign ulcer. Stricture likely secondary to radiation.     Per GI Dr Taylor, to go home on full liquids and follow up as an outpatient.     Diagnoses: Gastric outlet obstruction; Acute GI bleeding; Duodenal ulcer; Cholangiocarcinoma; Nausea and vomiting; Dehydration; HTN; DM-2; Pancreatic insufficiency

## 2017-06-13 NOTE — DIETITIAN INITIAL EVALUATION ADULT. - ENERGY NEEDS
ht:5' , wt:141.7 pounds, BMI:27.7 kg/m2, IBW: 100 pounds +/- 10%     pt admitted c anemia, found to have duodenal ulceration-likely cause of bleed; pt S/P EGD, and now S/P palliative duodenal stent placement for gastric outlet obstruction. pt c cholangiocarcinoma, S/P [facility], chemo and RT.

## 2017-06-13 NOTE — DISCHARGE NOTE ADULT - PATIENT PORTAL LINK FT
“You can access the Follow[facility] Patient Portal, offered by [facility], by registering with the following website: [email or web address]”

## 2017-06-13 NOTE — DIETITIAN INITIAL EVALUATION ADULT. - FACTORS AFF FOOD INTAKE
pt reports currently she is tolerating clear liquids well without any N+V; denies any chewing/swallowing issues; reports she had been having tarry stools before but not anymore

## 2017-06-13 NOTE — DIETITIAN INITIAL EVALUATION ADULT. - DIET TYPE
clear fluid/c Ensure Clear  x 3 daily- no apple-likely part of diet order since pt did not like apple flavor of Ensure Clear

## 2017-06-13 NOTE — PROGRESS NOTE ADULT - GASTROINTESTINAL
Soft, non-tender, no hepatosplenomegaly, normal bowel sounds
detailed exam
Soft, non-tender, no hepatosplenomegaly, normal bowel sounds

## 2017-06-13 NOTE — DIETITIAN INITIAL EVALUATION ADULT. - PHYSICAL APPEARANCE
nutrition focused physical exam conducted, decreased muscle tone noted in claves, mild fat loss around triceps/well nourished

## 2017-06-13 NOTE — DIETITIAN INITIAL EVALUATION ADULT. - ADHERENCE
generally pt follows a regular diet, does not like concentrated sweets much; reports she has been on Metformin for about 20-30 years since she has a strong family h/o T2DM and her MD had told her she was "barely pre-diabetic," reports she does not check her Finger sticks often but when she does check they are [facility]

## 2017-06-13 NOTE — PROGRESS NOTE ADULT - PROBLEM SELECTOR PROBLEM 1
Gastric outlet obstruction
Duodenal ulcer
Gastric outlet obstruction
Duodenal ulcer
Gastric outlet obstruction
Acute GI bleeding
Gastric outlet obstruction

## 2017-06-13 NOTE — PROGRESS NOTE ADULT - PROBLEM SELECTOR PLAN 4
Outpatient follow up
CT a/p reviewed, no evidence of recurrence
CT a/p reviewed, no evidence of recurrence
Outpatient follow up
CT chest/abdomen/pelvis to r/o mets
CT a/p reviewed with radiology- no evidence of metastatic disease seen on prelim report

## 2017-06-13 NOTE — PROGRESS NOTE ADULT - SUBJECTIVE AND OBJECTIVE BOX
CC: Vomiting    HPI: S/p duodenal stent placement. Feels well      Vital Signs Last 24 Hrs  T(C): 36.6, Max: 36.7 (06-12 @ 16:30)  T(F): 97.9, Max: 98 (06-12 @ 16:30)  HR: 67 (67 - 87)  BP: 148/84 (120/72 - 148/84)  BP(mean): --  RR: 18 (18 - 18)  SpO2: 96% (95% - 96%)      CBC Full  -  ( 13 Jun 2017 08:59 )  WBC Count : 8.69 K/uL  Hemoglobin : 9.4 g/dL  Hematocrit : 29.8 %    139  |  107  |  10  ----------------------------<  111<H>  4.4   |  19<L>  |  0.71    Ca    8.5      13 Jun 2017 08:47            MEDICATIONS  (STANDING):  pantoprazole  Injectable 40milliGRAM(s) IV Push two times a day  atorvastatin 40milliGRAM(s) Oral at bedtime  sodium chloride 0.9%. 1000milliLiter(s) IV Continuous <Continuous>

## 2017-06-13 NOTE — PROGRESS NOTE ADULT - SUBJECTIVE AND OBJECTIVE BOX
CC:     INTERVAL HPI/OVERNIGHT EVENTS:    MEDICATIONS  (STANDING):  pantoprazole  Injectable 40milliGRAM(s) IV Push two times a day  atorvastatin 40milliGRAM(s) Oral at bedtime  sodium chloride 0.9%. 1000milliLiter(s) IV Continuous <Continuous>    MEDICATIONS  (PRN):  acetaminophen   Tablet. 650milliGRAM(s) Oral every 6 hours PRN Moderate Pain (4 - 6)  ALPRAZolam 0.5milliGRAM(s) Oral at bedtime PRN Anxiety or insomnia      Allergies    No Known Allergies    Intolerances        ROS:     Vital Signs Last 24 Hrs  T(C): 36.6, Max: 36.7 (06-12 @ 16:30)  T(F): 97.9, Max: 98 (06-12 @ 16:30)  HR: 67 (67 - 87)  BP: 148/84 (120/72 - 148/84)  BP(mean): --  RR: 18 (18 - 18)  SpO2: 96% (95% - 96%)    Physical Exam:     LABS:                        9.4    8.69  )-----------( 344      ( 13 Jun 2017 08:59 )             29.8     06-13    139  |  107  |  10  ----------------------------<  111<H>  4.4   |  19<L>  |  0.71    Ca    8.5      13 Jun 2017 08:47            RADIOLOGY & ADDITIONAL TESTS:

## 2017-06-13 NOTE — PROGRESS NOTE ADULT - NEUROLOGICAL
Alert & oriented; no sensory, motor or coordination deficits, normal reflexes

## 2017-06-13 NOTE — PROGRESS NOTE ADULT - CONSTITUTIONAL
Well-developed, well nourished
detailed exam
Well-developed, well nourished

## 2017-06-13 NOTE — DISCHARGE NOTE ADULT - MEDICATION SUMMARY - MEDICATIONS TO TAKE
I will START or STAY ON the medications listed below when I get home from the hospital:    acetaminophen 325 mg oral tablet  -- 2 tab(s) by mouth every 6 hours, As needed, Moderate Pain (4 - 6)  -- Indication: For Mild pain    Crestor 10 mg oral tablet  -- 1 tab(s) by mouth once a day (at bedtime)  -- Indication: For Dyslipidemia    ALPRAZolam 0.25 mg oral tablet  -- 1 tab(s) by mouth once a day, As Needed  -- Indication: For Anxiety    metoprolol succinate 50 mg oral tablet, extended release  -- 1 tab(s) by mouth once a day  -- Indication: For Htn    esomeprazole  -- 20 milligram(s) by mouth once a day  -- Indication: For Gerd    multivitamin  -- 1 tab(s)  once a day  -- Indication: For supplement    Calcium 600+D 600 mg-200 intl units oral tablet  -- 1 tab(s) by mouth 3 times a day  -- Indication: For supplement

## 2017-06-13 NOTE — PROGRESS NOTE ADULT - CARDIOVASCULAR
Regular rate & rhythm, normal S1, S2; no murmurs, gallops or rubs; no S3, S4
detailed exam
Regular rate & rhythm, normal S1, S2; no murmurs, gallops or rubs; no S3, S4

## 2017-06-13 NOTE — PROGRESS NOTE ADULT - RESPIRATORY
Breath Sounds equal & clear to percussion & auscultation, no accessory muscle use
detailed exam
Breath Sounds equal & clear to percussion & auscultation, no accessory muscle use

## 2017-06-13 NOTE — PROGRESS NOTE ADULT - PSYCHIATRIC
Affect and characteristics of appearance, verbalizations, behaviors are appropriate

## 2017-06-13 NOTE — DISCHARGE NOTE ADULT - OTHER SIGNIFICANT FINDINGS
indings:       The esophagus was normal.       The stomach hyperemeia in the body and antrum. No evidence of residual fluid or food.       One nearly obstructing (greater than 90% obstructed) non-bleeding cratered duodenal ulcer was        found in the duodenal bulb. There is no evidence of perforation. Ulcer related to radiation.        The mid portion of the biliary WallFlex stent was visible through the duodenal bulb secondary        to the deep ulcer. Using a pediatric ERCP scope, able to visualize the pinpoint lumen that        was at an acute angle. Under fluoroscopy, a long 0.035 inch Stiff Jagwire was passed to the        fourth portion of the duodenum. The scope was then exchanged to a therapeutic EGD over the        wire. Balloon catheter was advanced over the wire and to distal duodenum. Contrast was        injected. The stricture/stenosis was only at the postbulbar duodenum. Over the wire WallFlex        stent catheter was advanced. A 22 mm x 90 mm enteral Wallstent under fluoroscopic guidance        was deployed. Contrast injected through the proximal portion of the stent which revealed        stent patency. However, mid portion of the stent remained narrowed secondary to the stricture.                                                                                                        Impression:          DUODENAL ULCER RELATED TO RADIATION WITH STENOSIS S/P PLACED OF A DUODENAL                        WALLFLEX STENT 22 MM X 90 MM.                       DENUDED DUODENAL WALL AND THE PRIOR PLACED WALLFLEX BILIARY STENT WAS EXPOSED                        AT THE LEVEL OF THE BULB  Recommendation:      - [facility]                       - Clear liquid diet today.                                                                                                          ________________  Oswaldo Taylor MD  6/12/2017 5:26:53 PM  Number of Addenda: 0

## 2017-06-13 NOTE — PROGRESS NOTE ADULT - PROBLEM SELECTOR PROBLEM 2
Acute GI bleeding
Duodenal ulcer
Duodenal ulcer
Gastric outlet obstruction
Duodenal ulcer
Duodenal ulcer
Gastric outlet obstruction
Acute GI bleeding
Duodenal ulcer
Acute blood loss anemia
Duodenal ulcer

## 2017-06-13 NOTE — DIETITIAN INITIAL EVALUATION ADULT. - NS AS NUTRI INTERV ED CONTENT
Discussed importance of adequate intake and wt maintenance. Discussed taking protein rich foods/liquids on trays.

## 2017-06-13 NOTE — PROGRESS NOTE ADULT - SUBJECTIVE AND OBJECTIVE BOX
INTERVAL HPI/OVERNIGHT EVENTS:    The patient has no new complaints. Tolerating full liquid diet without nausea or vomiting. Afebrile.     MEDICATIONS  (STANDING):  pantoprazole  Injectable 40milliGRAM(s) IV Push two times a day  atorvastatin 40milliGRAM(s) Oral at bedtime  sodium chloride 0.9%. 1000milliLiter(s) IV Continuous <Continuous>    MEDICATIONS  (PRN):  acetaminophen   Tablet. 650milliGRAM(s) Oral every 6 hours PRN Moderate Pain (4 - 6)  ALPRAZolam 0.5milliGRAM(s) Oral at bedtime PRN Anxiety or insomnia      Allergies    No Known Allergies      Vital Signs Last 24 Hrs  T(C): 36.6, Max: 36.7 (06-12 @ 16:30)  T(F): 97.9, Max: 98 (06-12 @ 16:30)  HR: 67 (67 - 87)  BP: 148/84 (120/72 - 148/84)  BP(mean): --  RR: 18 (18 - 18)  SpO2: 96% (95% - 96%)    PHYSICAL EXAM:    Constitutional: NAD, well-developed  Respiratory: CTA and P  Cardiovascular: S1 and S2, RRR, no M  Gastrointestinal: BS+, soft, NT/ND, neg HSM,  Extremities: No peripheral edema, neg clubing, cyanosis    LABS:                        9.4    8.69  )-----------( 344      ( 13 Jun 2017 08:59 )             29.8     06-13    139  |  107  |  10  ----------------------------<  111<H>  4.4   |  19<L>  |  0.71    Ca    8.5      13 Jun 2017 08:47          LIVER FUNCTIONS - ( 10 Luis Carlos 2017 08:01 )  Alb: 3.1 g/dL / Pro: 5.9 g/dL / ALK PHOS: 100 U/L / ALT: 18 U/L / AST: 20 U/L / GGT: x             RADIOLOGY & ADDITIONAL TESTS:

## 2017-06-13 NOTE — PROGRESS NOTE ADULT - PROBLEM SELECTOR PLAN 1
S/p duodenal stent placement. Advance diet to full liquids. D/w GI Dr Taylor
Biopsy negative for malignancy  Cont PPI  May have been the source of bleeding, but needs colo at some point to eval large intestine.
Continue clear liquid diet. Added Ensure clear supplements  Plan to pallative duodenal stent placement by Dr. Jones early next week
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 For duodenal stent placement on Monday, NPO after midnight on Sunday with IVF on Monday as procedure schedule for late afternoon
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 For duodenal stent placement on Monday, NPO after midnight tonight with IVF on Monday as procedure scheduled for late afternoon
Biopsy negative for malignancy  Cont PPI  May have been the source of bleeding, but needs colo at some point to eval large intestine.
Continue clear liquid diet. Continue Ensure clear supplements  Plan for pallative duodenal stent placement by Dr. Taylor on Monday.   NPO after midnight on Sunday night.   Recommend start maintenance IVF while patient is NPO as procedure is planned for around 5PM on Monday.
For duondenal stent placement today
Pt tolerating clears so far. Per GI Dr Jones, stent not indicated at this time.
Hemodynamically stable. EGD today
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 For duodenal stent placement on Monday

## 2017-06-19 ENCOUNTER — APPOINTMENT (OUTPATIENT)
Dept: CT IMAGING | Facility: CLINIC | Age: 77
End: 2017-06-19

## 2017-06-22 ENCOUNTER — APPOINTMENT (OUTPATIENT)
Dept: FAMILY MEDICINE | Facility: CLINIC | Age: 77
End: 2017-06-22

## 2017-06-22 VITALS
HEIGHT: 60 IN | OXYGEN SATURATION: 98 % | WEIGHT: 137.38 LBS | SYSTOLIC BLOOD PRESSURE: 118 MMHG | DIASTOLIC BLOOD PRESSURE: 70 MMHG | HEART RATE: 74 BPM | RESPIRATION RATE: 16 BRPM | BODY MASS INDEX: 26.97 KG/M2

## 2017-06-22 VITALS — SYSTOLIC BLOOD PRESSURE: 110 MMHG | DIASTOLIC BLOOD PRESSURE: 70 MMHG | RESPIRATION RATE: 16 BRPM | HEART RATE: 72 BPM

## 2017-06-22 RX ORDER — PSYLLIUM HUSK 0.4 G
1000-800 CAPSULE ORAL
Refills: 0 | Status: COMPLETED | COMMUNITY
Start: 2017-02-21 | End: 2017-06-22

## 2017-06-25 ENCOUNTER — TRANSCRIPTION ENCOUNTER (OUTPATIENT)
Age: 77
End: 2017-06-25

## 2017-06-25 LAB
BASOPHILS # BLD AUTO: 0.04 K/UL
BASOPHILS NFR BLD AUTO: 0.4 %
EOSINOPHIL # BLD AUTO: 0.37 K/UL
EOSINOPHIL NFR BLD AUTO: 3.5 %
HCT VFR BLD CALC: 32.4 %
HGB BLD-MCNC: 9.8 G/DL
IMM GRANULOCYTES NFR BLD AUTO: 0.2 %
LYMPHOCYTES # BLD AUTO: 0.87 K/UL
LYMPHOCYTES NFR BLD AUTO: 8.2 %
MAN DIFF?: NORMAL
MCHC RBC-ENTMCNC: 27.5 PG
MCHC RBC-ENTMCNC: 30.2 GM/DL
MCV RBC AUTO: 90.8 FL
MONOCYTES # BLD AUTO: 1.03 K/UL
MONOCYTES NFR BLD AUTO: 9.7 %
NEUTROPHILS # BLD AUTO: 8.29 K/UL
NEUTROPHILS NFR BLD AUTO: 78 %
PLATELET # BLD AUTO: 446 K/UL
RBC # BLD: 3.57 M/UL
RBC # FLD: 16.4 %
WBC # FLD AUTO: 10.62 K/UL

## 2017-07-06 ENCOUNTER — APPOINTMENT (OUTPATIENT)
Dept: RADIATION ONCOLOGY | Facility: CLINIC | Age: 77
End: 2017-07-06

## 2017-07-12 ENCOUNTER — APPOINTMENT (OUTPATIENT)
Dept: FAMILY MEDICINE | Facility: CLINIC | Age: 77
End: 2017-07-12

## 2017-07-12 VITALS — SYSTOLIC BLOOD PRESSURE: 140 MMHG | RESPIRATION RATE: 16 BRPM | HEART RATE: 80 BPM | DIASTOLIC BLOOD PRESSURE: 90 MMHG

## 2017-07-12 VITALS
DIASTOLIC BLOOD PRESSURE: 80 MMHG | SYSTOLIC BLOOD PRESSURE: 130 MMHG | HEIGHT: 60 IN | BODY MASS INDEX: 26.58 KG/M2 | WEIGHT: 135.38 LBS

## 2017-07-12 DIAGNOSIS — I49.9 CARDIAC ARRHYTHMIA, UNSPECIFIED: ICD-10-CM

## 2017-07-13 LAB
ALBUMIN SERPL ELPH-MCNC: 4.2 G/DL
ALP BLD-CCNC: 124 U/L
ALT SERPL-CCNC: 39 U/L
ANION GAP SERPL CALC-SCNC: 18 MMOL/L
APPEARANCE: CLEAR
AST SERPL-CCNC: 36 U/L
BACTERIA: NEGATIVE
BASOPHILS # BLD AUTO: 0.03 K/UL
BASOPHILS NFR BLD AUTO: 0.3 %
BILIRUB SERPL-MCNC: 0.2 MG/DL
BILIRUBIN URINE: NEGATIVE
BLOOD URINE: NEGATIVE
BUN SERPL-MCNC: 15 MG/DL
CALCIUM SERPL-MCNC: 10 MG/DL
CHLORIDE SERPL-SCNC: 102 MMOL/L
CHOLEST SERPL-MCNC: 160 MG/DL
CHOLEST/HDLC SERPL: 3 RATIO
CO2 SERPL-SCNC: 20 MMOL/L
COLOR: YELLOW
CREAT SERPL-MCNC: 0.79 MG/DL
CREAT SPEC-SCNC: 22 MG/DL
EOSINOPHIL # BLD AUTO: 0.21 K/UL
EOSINOPHIL NFR BLD AUTO: 2.1 %
GLUCOSE QUALITATIVE U: NORMAL MG/DL
GLUCOSE SERPL-MCNC: 84 MG/DL
HBA1C MFR BLD HPLC: 5.8 %
HCT VFR BLD CALC: 35 %
HDLC SERPL-MCNC: 53 MG/DL
HGB BLD-MCNC: 10.4 G/DL
HYALINE CASTS: 0 /LPF
IMM GRANULOCYTES NFR BLD AUTO: 0.2 %
KETONES URINE: NEGATIVE
LDLC SERPL CALC-MCNC: 79 MG/DL
LEUKOCYTE ESTERASE URINE: NEGATIVE
LYMPHOCYTES # BLD AUTO: 1.43 K/UL
LYMPHOCYTES NFR BLD AUTO: 14.5 %
MAN DIFF?: NORMAL
MCHC RBC-ENTMCNC: 26.5 PG
MCHC RBC-ENTMCNC: 29.7 GM/DL
MCV RBC AUTO: 89.3 FL
MICROALBUMIN 24H UR DL<=1MG/L-MCNC: 0.3 MG/DL
MICROALBUMIN/CREAT 24H UR-RTO: 14 MG/G
MICROSCOPIC-UA: NORMAL
MONOCYTES # BLD AUTO: 1.14 K/UL
MONOCYTES NFR BLD AUTO: 11.6 %
NEUTROPHILS # BLD AUTO: 7.03 K/UL
NEUTROPHILS NFR BLD AUTO: 71.3 %
NITRITE URINE: NEGATIVE
PH URINE: 7
PLATELET # BLD AUTO: 347 K/UL
POTASSIUM SERPL-SCNC: 4.6 MMOL/L
PROT SERPL-MCNC: 7.7 G/DL
PROTEIN URINE: NEGATIVE MG/DL
RBC # BLD: 3.92 M/UL
RBC # FLD: 16.7 %
RED BLOOD CELLS URINE: 0 /HPF
SODIUM SERPL-SCNC: 140 MMOL/L
SPECIFIC GRAVITY URINE: 1.01
SQUAMOUS EPITHELIAL CELLS: 0 /HPF
T4 SERPL-MCNC: 5.9 UG/DL
TRIGL SERPL-MCNC: 138 MG/DL
TSH SERPL-ACNC: 2.42 UIU/ML
URATE SERPL-MCNC: 3.9 MG/DL
UROBILINOGEN URINE: NORMAL MG/DL
WBC # FLD AUTO: 9.86 K/UL
WHITE BLOOD CELLS URINE: 0 /HPF

## 2017-09-13 ENCOUNTER — APPOINTMENT (OUTPATIENT)
Dept: FAMILY MEDICINE | Facility: CLINIC | Age: 77
End: 2017-09-13
Payer: MEDICARE

## 2017-09-13 VITALS — HEART RATE: 76 BPM | DIASTOLIC BLOOD PRESSURE: 80 MMHG | RESPIRATION RATE: 16 BRPM | SYSTOLIC BLOOD PRESSURE: 120 MMHG

## 2017-09-13 VITALS
HEIGHT: 60 IN | DIASTOLIC BLOOD PRESSURE: 72 MMHG | SYSTOLIC BLOOD PRESSURE: 124 MMHG | BODY MASS INDEX: 26.97 KG/M2 | WEIGHT: 137.38 LBS

## 2017-09-13 DIAGNOSIS — E78.5 HYPERLIPIDEMIA, UNSPECIFIED: ICD-10-CM

## 2017-09-13 DIAGNOSIS — D64.9 ANEMIA, UNSPECIFIED: ICD-10-CM

## 2017-09-13 PROCEDURE — G0008: CPT

## 2017-09-13 PROCEDURE — 99214 OFFICE O/P EST MOD 30 MIN: CPT | Mod: 25

## 2017-09-13 PROCEDURE — 90662 IIV NO PRSV INCREASED AG IM: CPT

## 2017-10-26 ENCOUNTER — APPOINTMENT (OUTPATIENT)
Dept: FAMILY MEDICINE | Facility: CLINIC | Age: 77
End: 2017-10-26
Payer: MEDICARE

## 2017-10-26 VITALS
RESPIRATION RATE: 16 BRPM | HEIGHT: 60 IN | BODY MASS INDEX: 23.75 KG/M2 | DIASTOLIC BLOOD PRESSURE: 76 MMHG | HEART RATE: 65 BPM | SYSTOLIC BLOOD PRESSURE: 122 MMHG | WEIGHT: 121 LBS | OXYGEN SATURATION: 98 %

## 2017-10-26 VITALS — HEART RATE: 72 BPM | DIASTOLIC BLOOD PRESSURE: 80 MMHG | SYSTOLIC BLOOD PRESSURE: 120 MMHG | RESPIRATION RATE: 16 BRPM

## 2017-10-26 DIAGNOSIS — K29.71 GASTRITIS, UNSPECIFIED, WITH BLEEDING: ICD-10-CM

## 2017-10-26 DIAGNOSIS — C22.1 INTRAHEPATIC BILE DUCT CARCINOMA: ICD-10-CM

## 2017-10-26 DIAGNOSIS — R79.89 OTHER SPECIFIED ABNORMAL FINDINGS OF BLOOD CHEMISTRY: ICD-10-CM

## 2017-10-26 DIAGNOSIS — E11.9 TYPE 2 DIABETES MELLITUS W/OUT COMPLICATIONS: ICD-10-CM

## 2017-10-26 DIAGNOSIS — I10 ESSENTIAL (PRIMARY) HYPERTENSION: ICD-10-CM

## 2017-10-26 PROCEDURE — 99214 OFFICE O/P EST MOD 30 MIN: CPT | Mod: 25

## 2017-10-26 PROCEDURE — 36415 COLL VENOUS BLD VENIPUNCTURE: CPT

## 2017-10-26 RX ORDER — TRAMADOL HYDROCHLORIDE 50 MG/1
50 TABLET, COATED ORAL
Qty: 20 | Refills: 0 | Status: COMPLETED | COMMUNITY
Start: 2017-10-02

## 2017-10-26 RX ORDER — SUCRALFATE 1 G/10ML
1 SUSPENSION ORAL
Qty: 840 | Refills: 0 | Status: ACTIVE | COMMUNITY
Start: 2017-10-03

## 2017-10-26 RX ORDER — PANTOPRAZOLE 40 MG/1
40 TABLET, DELAYED RELEASE ORAL
Qty: 60 | Refills: 0 | Status: ACTIVE | COMMUNITY
Start: 2017-10-02

## 2017-10-26 RX ORDER — OMEPRAZOLE 20 MG/1
20 CAPSULE, DELAYED RELEASE ORAL
Qty: 90 | Refills: 3 | Status: COMPLETED | COMMUNITY
End: 2017-10-26

## 2017-10-26 RX ORDER — VALSARTAN 160 MG/1
160 TABLET, COATED ORAL DAILY
Qty: 90 | Refills: 3 | Status: ACTIVE | COMMUNITY
Start: 2017-04-09 | End: 1900-01-01

## 2017-10-26 RX ORDER — VANCOMYCIN HYDROCHLORIDE 1 G/20ML
1000 INJECTION, POWDER, LYOPHILIZED, FOR SOLUTION INTRAVENOUS
Qty: 4 | Refills: 0 | Status: COMPLETED | COMMUNITY
Start: 2017-10-18

## 2017-10-26 RX ORDER — OXYCODONE 10 MG/1
10 TABLET ORAL
Qty: 40 | Refills: 0 | Status: ACTIVE | COMMUNITY
Start: 2017-10-13

## 2017-10-26 RX ORDER — CLOTRIMAZOLE 10 MG/1
10 LOZENGE ORAL 3 TIMES DAILY
Qty: 45 | Refills: 2 | Status: COMPLETED | COMMUNITY
Start: 2017-02-21 | End: 2017-10-26

## 2017-10-26 RX ORDER — FENTANYL 25 UG/H
25 PATCH, EXTENDED RELEASE TRANSDERMAL
Qty: 10 | Refills: 0 | Status: ACTIVE | COMMUNITY
Start: 2017-10-26 | End: 1900-01-01

## 2017-10-26 RX ORDER — HYDROMORPHONE HYDROCHLORIDE 2 MG/1
2 TABLET ORAL
Qty: 14 | Refills: 0 | Status: COMPLETED | COMMUNITY
Start: 2017-10-03

## 2017-10-28 LAB
ALBUMIN SERPL ELPH-MCNC: 3.1 G/DL
ALP BLD-CCNC: 326 U/L
ALT SERPL-CCNC: 29 U/L
ANION GAP SERPL CALC-SCNC: 16 MMOL/L
AST SERPL-CCNC: 30 U/L
BASOPHILS # BLD AUTO: 0.05 K/UL
BASOPHILS NFR BLD AUTO: 0.4 %
BILIRUB SERPL-MCNC: 0.6 MG/DL
BUN SERPL-MCNC: 10 MG/DL
CALCIUM SERPL-MCNC: 9.7 MG/DL
CHLORIDE SERPL-SCNC: 98 MMOL/L
CHOLEST SERPL-MCNC: 117 MG/DL
CHOLEST/HDLC SERPL: 2.1 RATIO
CO2 SERPL-SCNC: 20 MMOL/L
CREAT SERPL-MCNC: 0.61 MG/DL
EOSINOPHIL # BLD AUTO: 0.23 K/UL
EOSINOPHIL NFR BLD AUTO: 1.8 %
GLUCOSE SERPL-MCNC: 129 MG/DL
HBA1C MFR BLD HPLC: 5.9 %
HCT VFR BLD CALC: 31.3 %
HDLC SERPL-MCNC: 55 MG/DL
HGB BLD-MCNC: 9.9 G/DL
IMM GRANULOCYTES NFR BLD AUTO: 0.2 %
LDLC SERPL CALC-MCNC: 40 MG/DL
LYMPHOCYTES # BLD AUTO: 1.28 K/UL
LYMPHOCYTES NFR BLD AUTO: 9.9 %
MAN DIFF?: NORMAL
MCHC RBC-ENTMCNC: 25.3 PG
MCHC RBC-ENTMCNC: 31.6 GM/DL
MCV RBC AUTO: 79.8 FL
MONOCYTES # BLD AUTO: 0.99 K/UL
MONOCYTES NFR BLD AUTO: 7.7 %
NEUTROPHILS # BLD AUTO: 10.33 K/UL
NEUTROPHILS NFR BLD AUTO: 80 %
PLATELET # BLD AUTO: 332 K/UL
POTASSIUM SERPL-SCNC: 4.2 MMOL/L
PROT SERPL-MCNC: 6.8 G/DL
RBC # BLD: 3.92 M/UL
RBC # FLD: 17.9 %
SODIUM SERPL-SCNC: 134 MMOL/L
T4 SERPL-MCNC: 8.4 UG/DL
TRIGL SERPL-MCNC: 108 MG/DL
TSH SERPL-ACNC: 3.21 UIU/ML
URATE SERPL-MCNC: 3 MG/DL
WBC # FLD AUTO: 12.9 K/UL

## 2017-11-03 ENCOUNTER — OUTPATIENT (OUTPATIENT)
Dept: OUTPATIENT SERVICES | Facility: HOSPITAL | Age: 77
LOS: 1 days | Discharge: HOME | End: 2017-11-03

## 2017-11-03 DIAGNOSIS — Z98.89 OTHER SPECIFIED POSTPROCEDURAL STATES: Chronic | ICD-10-CM

## 2017-11-03 DIAGNOSIS — Z90.49 ACQUIRED ABSENCE OF OTHER SPECIFIED PARTS OF DIGESTIVE TRACT: Chronic | ICD-10-CM

## 2017-11-03 DIAGNOSIS — C22.1 INTRAHEPATIC BILE DUCT CARCINOMA: ICD-10-CM

## 2017-11-07 ENCOUNTER — RX RENEWAL (OUTPATIENT)
Age: 77
End: 2017-11-07

## 2017-11-07 DIAGNOSIS — R11.0 NAUSEA: ICD-10-CM

## 2017-11-07 RX ORDER — ONDANSETRON 4 MG/1
4 TABLET ORAL EVERY 6 HOURS
Qty: 30 | Refills: 0 | Status: ACTIVE | COMMUNITY
Start: 2017-11-07 | End: 1900-01-01

## 2017-11-08 ENCOUNTER — RX RENEWAL (OUTPATIENT)
Age: 77
End: 2017-11-08

## 2017-11-08 RX ORDER — OXYCODONE 10 MG/1
10 TABLET ORAL
Qty: 90 | Refills: 0 | Status: ACTIVE | COMMUNITY
Start: 2017-10-26 | End: 1900-01-01

## 2017-11-13 ENCOUNTER — APPOINTMENT (OUTPATIENT)
Dept: HEMATOLOGY ONCOLOGY | Facility: CLINIC | Age: 77
End: 2017-11-13

## 2017-11-15 ENCOUNTER — APPOINTMENT (OUTPATIENT)
Dept: FAMILY MEDICINE | Facility: CLINIC | Age: 77
End: 2017-11-15

## 2017-11-15 ENCOUNTER — RX RENEWAL (OUTPATIENT)
Age: 77
End: 2017-11-15

## 2017-11-15 RX ORDER — METOPROLOL SUCCINATE 25 MG/1
25 TABLET, EXTENDED RELEASE ORAL
Qty: 90 | Refills: 2 | Status: ACTIVE | COMMUNITY
Start: 2017-05-10 | End: 1900-01-01

## 2017-11-22 ENCOUNTER — APPOINTMENT (OUTPATIENT)
Dept: FAMILY MEDICINE | Facility: CLINIC | Age: 77
End: 2017-11-22

## 2017-12-15 ENCOUNTER — APPOINTMENT (OUTPATIENT)
Dept: HEMATOLOGY ONCOLOGY | Facility: CLINIC | Age: 77
End: 2017-12-15

## 2018-10-27 NOTE — H&P ADULT. - GUM GEN PE MLT EXAM PC
Patient reports bilateral knee pain and swelling to both knees. Symptoms started on yesterday. Reports taking Mobic approx 3 hours PTA.   not examined

## 2019-03-27 NOTE — PROGRESS NOTE ADULT - PROBLEM SELECTOR PLAN 3
While preparing dinner, the patient sustained a laceration to the index finger of left hand with a knife. No sensory or functional loss. Tdap last boosted 8 years ago.
Secondary to radiation
Continue to monitor H/H and transfuse as needed.   May consider colonoscopy in the near future (?outpatient if H/H remains stable) to rule out a colonic source of GI bleeding, however pt unlikely to tolerate a full bowel prep with her gastric outlet obstruction. Will need duodenal stent placement prior to attempting colonoscopy.
Hb stable. Monitor CBC
Hb stable. Monitor CBC
No evidence of recurrent disease at this time  f/u with Dr. Smith as out pt
Continue to monitor H/H and transfuse as needed.
No evidence of recurrent disease at this time  f/u with Dr. Smith as out pt
Continue PPI
Hb stable
Currently on no active treatment
Hb stable. Monitor

## 2019-05-13 NOTE — ED ADULT NURSE NOTE - CCCP TRG CHIEF CMPLNT
05/13/19                            Diane Rodríguez  [address]  [facility] [address]    To Whom It May Concern:    This is to certify Diane Rodríguez was evaluated with Michael Guerrero DO on 05/13/19 and may return to work tomorrow.     RESTRICTIONS: Sitting job only and no driving that requires use of the left foot until cleared by ortho.           Michael Guerrero DO  [facility]  [address]  [facility] [id number]             abnormal lab result

## 2020-08-12 NOTE — DISCHARGE NOTE ADULT - PROVIDER RX CONTACT NUMBER
"Pt to ER via POV, c/o "For about 2-3 days, I've been feeling aches, headache, shortness of breath, occasional cough, some nausea."     Expsoure to [facility] from [facility].  "
[phone]

## 2023-10-15 NOTE — CONSULT NOTE ADULT - ASSESSMENT
76F with h/o cholangiocarcinoma s/p partial resection and chemo/RT (completed chemo 7/14/2016 , completed XRT 12/2016) with metallic stent placed for biliary stricture (2/2016) who presents with symptomatic anemia and melena. Pt taking Advil daily. Presentation suspicious for upper GI bleeding, possibly from peptic ulcer disease, radiation gastroenteritis or bleeding from cholangiocarcinoma.   Plan for upper endoscopy today. Resident

## 2023-11-26 NOTE — ED ADULT NURSE NOTE - NS PRO AD NO ADVANCE DIRECTIVE
No
Pt presents with abdominal pain since yesterday. Denies vomiting, fevers or diarrhea. Pt complaining of lower abdominal pain, abdomen soft and non tender. Denies urinary symptoms. Denies PMH, NKA, IUTD.

## 2025-01-27 NOTE — H&P ADULT. - CENTRAL VENOUS CATHETER
Subjective   Qing Haider is a 63 y.o. female who presents for Follow-up (Started the semaglutide).    Not really having any weight loss with the low dose semaglutide. Doesn't notice any change in her appetite.  Still exercising the same amount.  Works very hard on her diet.           Review of Systems    Objective   /90   Pulse 60   Temp 36.6 °C (97.9 °F)   Wt 78.9 kg (174 lb)   BMI 30.82 kg/m²     Physical Exam  Constitutional:       General: She is not in acute distress.  Neurological:      Mental Status: She is alert.         Assessment/Plan   Assessment & Plan  Overweight  Will increase semaglutide to 1.2 mg daily and then to 1.8 mg daily the following month.    Continue with diet and exercise.   Orders:    Follow Up In [facility] - PCP - Established    Encounter for screening mammogram for malignant neoplasm of breast    Orders:    BI mammo bilateral screening tomosynthesis; Future           There are no Patient Instructions on file for this visit.  
no

## 2025-03-07 NOTE — ED ADULT NURSE NOTE - INTEGUMENTARY WDL
reports that she has never smoked. She has never used smokeless tobacco. She reports that she does not drink alcohol and does not use drugs.  Family History: family history includes Arthritis in her brother and brother; Atrial Fibrillation in her paternal grandmother; COPD in her brother and father; Colon Cancer in her paternal grandfather; Colon Polyps in her father; Deep Vein Thrombosis in her maternal grandmother; Glaucoma in her brother; Heart Attack in her brother; Heart Disease in her maternal grandfather; Hemochromatosis in her brother; High Blood Pressure in her brother; Hypothyroidism in her brother, maternal grandmother, and paternal grandmother; Mult Sclerosis (age of onset: 50) in her father; Other in her brother; Ovarian Cancer (age of onset: 80) in her maternal aunt; Psoriasis in her brother; Rheum Arthritis in her brother and mother; Stroke in her brother; Stroke (age of onset: 33) in her mother.   Allergies: Patient has no known allergies.    Physical Exam:  (Vital signs reviewed) /80 (Site: Left Upper Arm, Position: Sitting)   Pulse (!) 103   Temp 98 °F (36.7 °C)   Resp 22   Ht 1.588 m (5' 2.5\")   Wt 124.8 kg (275 lb 3.2 oz)   LMP  (LMP Unknown)   SpO2 99%   BMI 49.53 kg/m²   Oxygen Saturation Interpretation: Normal.   Constitutional:  Alert, development consistent with age.  Eyes:  PERRL, EOMI, no discharge or conjunctival injection.  Ears:  External ears without lesions.    Neck:  Normal ROM.  Supple.  Lungs:  Clear to auscultation and breath sounds equal.  Heart:  Regular rate and rhythm, normal heart sounds, without pathological murmurs, ectopy, gallops, or rubs.  Left Knee: See photo of left knee below              Tenderness:  Mild TTP over entire left lower extremity.               Swelling/Effusion: Moderate edema noted over left knee and edema noted down lower leg.               ROM: ROM 0º-120º with mild to moderate discomfort.               Skin:  Extensive bruising noted to  Color consistent with ethnicity/race, warm, dry intact, resilient.